# Patient Record
Sex: MALE | Race: WHITE | Employment: FULL TIME | URBAN - METROPOLITAN AREA
[De-identification: names, ages, dates, MRNs, and addresses within clinical notes are randomized per-mention and may not be internally consistent; named-entity substitution may affect disease eponyms.]

---

## 2017-04-19 ENCOUNTER — TRANSFERRED RECORDS (OUTPATIENT)
Dept: HEALTH INFORMATION MANAGEMENT | Facility: CLINIC | Age: 57
End: 2017-04-19

## 2018-05-11 ENCOUNTER — TRANSFERRED RECORDS (OUTPATIENT)
Dept: MULTI SPECIALTY CLINIC | Facility: CLINIC | Age: 58
End: 2018-05-11

## 2018-09-11 ENCOUNTER — TRANSFERRED RECORDS (OUTPATIENT)
Dept: HEALTH INFORMATION MANAGEMENT | Facility: CLINIC | Age: 58
End: 2018-09-11

## 2018-09-11 LAB — EJECTION FRACTION: 58

## 2018-09-21 ENCOUNTER — TRANSFERRED RECORDS (OUTPATIENT)
Dept: HEALTH INFORMATION MANAGEMENT | Facility: CLINIC | Age: 58
End: 2018-09-21

## 2018-09-21 LAB
CHOLEST SERPL-MCNC: 162 MG/DL
CREAT SERPL-MCNC: 0.97 MG/DL (ref 0.8–1.3)
GFR SERPL CREATININE-BSD FRML MDRD: >60 ML/MIN
GLUCOSE SERPL-MCNC: 98 MG/DL (ref 70–110)
HDLC SERPL-MCNC: 53 MG/DL
LDLC SERPL CALC-MCNC: 78 MG/DL
POTASSIUM SERPL-SCNC: 4 MMOL/L (ref 3.5–5.1)
TRIGL SERPL-MCNC: 154 MG/DL
TSH SERPL-ACNC: 0.61 MIU/ML (ref 0.36–3.74)

## 2018-12-14 ENCOUNTER — TRANSFERRED RECORDS (OUTPATIENT)
Dept: HEALTH INFORMATION MANAGEMENT | Facility: CLINIC | Age: 58
End: 2018-12-14

## 2019-10-01 ENCOUNTER — HEALTH MAINTENANCE LETTER (OUTPATIENT)
Age: 59
End: 2019-10-01

## 2019-10-21 ENCOUNTER — OFFICE VISIT (OUTPATIENT)
Dept: FAMILY MEDICINE | Facility: CLINIC | Age: 59
End: 2019-10-21
Payer: COMMERCIAL

## 2019-10-21 VITALS
RESPIRATION RATE: 18 BRPM | HEART RATE: 67 BPM | OXYGEN SATURATION: 97 % | HEIGHT: 62 IN | DIASTOLIC BLOOD PRESSURE: 78 MMHG | WEIGHT: 153 LBS | BODY MASS INDEX: 28.16 KG/M2 | SYSTOLIC BLOOD PRESSURE: 137 MMHG | TEMPERATURE: 97.8 F

## 2019-10-21 DIAGNOSIS — E78.5 HYPERLIPIDEMIA LDL GOAL <130: ICD-10-CM

## 2019-10-21 DIAGNOSIS — Z11.59 ENCOUNTER FOR HEPATITIS C SCREENING TEST FOR LOW RISK PATIENT: ICD-10-CM

## 2019-10-21 DIAGNOSIS — Z00.01 ENCOUNTER FOR ROUTINE ADULT MEDICAL EXAM WITH ABNORMAL FINDINGS: Primary | ICD-10-CM

## 2019-10-21 DIAGNOSIS — Z12.11 SPECIAL SCREENING FOR MALIGNANT NEOPLASMS, COLON: ICD-10-CM

## 2019-10-21 DIAGNOSIS — Z12.5 SCREENING PSA (PROSTATE SPECIFIC ANTIGEN): ICD-10-CM

## 2019-10-21 DIAGNOSIS — I10 BENIGN HYPERTENSION: ICD-10-CM

## 2019-10-21 DIAGNOSIS — E06.3 HYPOTHYROIDISM DUE TO HASHIMOTO'S THYROIDITIS: ICD-10-CM

## 2019-10-21 PROCEDURE — 99214 OFFICE O/P EST MOD 30 MIN: CPT | Mod: 25 | Performed by: PHYSICIAN ASSISTANT

## 2019-10-21 PROCEDURE — 99386 PREV VISIT NEW AGE 40-64: CPT | Performed by: PHYSICIAN ASSISTANT

## 2019-10-21 RX ORDER — SIMVASTATIN 20 MG
20 TABLET ORAL AT BEDTIME
Qty: 90 TABLET | Refills: 3 | Status: SHIPPED | OUTPATIENT
Start: 2019-10-21 | End: 2020-10-27

## 2019-10-21 RX ORDER — LEVOTHYROXINE SODIUM 100 UG/1
100 TABLET ORAL DAILY
Qty: 90 TABLET | Refills: 3 | Status: SHIPPED | OUTPATIENT
Start: 2019-10-21 | End: 2020-10-27

## 2019-10-21 RX ORDER — AMLODIPINE BESYLATE 2.5 MG/1
2.5 TABLET ORAL DAILY
Refills: 3 | COMMUNITY
Start: 2019-07-15 | End: 2019-10-21

## 2019-10-21 RX ORDER — AMLODIPINE BESYLATE 2.5 MG/1
2.5 TABLET ORAL DAILY
Qty: 90 TABLET | Refills: 1 | Status: SHIPPED | OUTPATIENT
Start: 2019-10-21 | End: 2019-10-21

## 2019-10-21 RX ORDER — SIMVASTATIN 20 MG
20 TABLET ORAL
COMMUNITY
Start: 2018-09-24 | End: 2019-10-21

## 2019-10-21 RX ORDER — LEVOTHYROXINE SODIUM 100 UG/1
100 TABLET ORAL DAILY
Refills: 3 | COMMUNITY
Start: 2019-07-15 | End: 2019-10-21

## 2019-10-21 RX ORDER — LISINOPRIL AND HYDROCHLOROTHIAZIDE 20; 25 MG/1; MG/1
1 TABLET ORAL
COMMUNITY
Start: 2018-09-24 | End: 2019-10-21

## 2019-10-21 RX ORDER — AMLODIPINE BESYLATE 5 MG/1
5 TABLET ORAL DAILY
Qty: 90 TABLET | Refills: 1 | Status: SHIPPED | OUTPATIENT
Start: 2019-10-21 | End: 2020-04-28

## 2019-10-21 RX ORDER — LISINOPRIL AND HYDROCHLOROTHIAZIDE 20; 25 MG/1; MG/1
1 TABLET ORAL DAILY
Qty: 90 TABLET | Refills: 1 | Status: SHIPPED | OUTPATIENT
Start: 2019-10-21 | End: 2020-04-28

## 2019-10-21 ASSESSMENT — MIFFLIN-ST. JEOR: SCORE: 1388.25

## 2019-10-21 NOTE — PROGRESS NOTES
SUBJECTIVE:   CC: Esau Avendano is an 59 year old male who presents for preventive health visit.     Healthy Habits:    Do you get at least three servings of calcium containing foods daily (dairy, green leafy vegetables, etc.)? no    Amount of exercise or daily activities, outside of work: none currently     Problems taking medications regularly No    Medication side effects: No    Have you had an eye exam in the past two years? yes    Do you see a dentist twice per year? yes    Do you have sleep apnea, excessive snoring or daytime drowsiness?no      Hyperlipidemia Follow-Up      Are you having any of the following symptoms? (Select all that apply)  No complaints of shortness of breath, chest pain or pressure.  No increased sweating or nausea with activity.  No left-sided neck or arm pain.  No complaints of pain in calves when walking 1-2 blocks.    Are you regularly taking any medication or supplement to lower your cholesterol?   Yes- simvastatin 20mg nightly    Are you having muscle aches or other side effects that you think could be caused by your cholesterol lowering medication?  No      Hypertension Follow-up      Do you check your blood pressure regularly outside of the clinic? No     Are you following a low salt diet? No    Are your blood pressures ever more than 140 on the top number (systolic) OR more   than 90 on the bottom number (diastolic), for example 140/90? Yes  Hypothyroidism Follow-up      Since last visit, patient describes the following symptoms: Weight stable, no hair loss, no skin changes, no constipation, no loose stools      Today's PHQ-2 Score:   PHQ-2 ( 1999 Pfizer) 4/5/2013 2/24/2012   Q1: Little interest or pleasure in doing things 0 0   Q2: Feeling down, depressed or hopeless 0 0   PHQ-2 Score 0 0       Abuse: Current or Past(Physical, Sexual or Emotional)- No  Do you feel safe in your environment? Yes    Social History     Tobacco Use     Smoking status: Never Smoker     Smokeless  tobacco: Never Used   Substance Use Topics     Alcohol use: No     If you drink alcohol do you typically have >3 drinks per day or >7 drinks per week? No                      Last PSA: No results found for: PSA    Reviewed orders with patient. Reviewed health maintenance and updated orders accordingly - Yes  Lab work is in process  BP Readings from Last 3 Encounters:   10/21/19 137/78   04/05/13 120/58   02/24/12 174/84    Wt Readings from Last 3 Encounters:   10/21/19 69.4 kg (153 lb)   04/05/13 62.4 kg (137 lb 9.6 oz)   02/24/12 64.9 kg (143 lb)                  Patient Active Problem List   Diagnosis     Hypothyroidism     Hyperlipidemia LDL goal <130     Benign hypertension     Erectile dysfunction     No past surgical history on file.    Social History     Tobacco Use     Smoking status: Never Smoker     Smokeless tobacco: Never Used   Substance Use Topics     Alcohol use: No     Family History   Problem Relation Age of Onset     Cerebrovascular Disease Mother          Current Outpatient Medications   Medication Sig Dispense Refill     amLODIPine (NORVASC) 5 MG tablet Take 1 tablet (5 mg) by mouth daily 90 tablet 1     levothyroxine (SYNTHROID/LEVOTHROID) 100 MCG tablet Take 1 tablet (100 mcg) by mouth daily 90 tablet 3     lisinopril-hydrochlorothiazide (PRINZIDE/ZESTORETIC) 20-25 MG tablet Take 1 tablet by mouth daily 90 tablet 1     simvastatin (ZOCOR) 20 MG tablet Take 1 tablet (20 mg) by mouth At Bedtime 90 tablet 3       Reviewed and updated as needed this visit by clinical staff  Tobacco  Allergies  Meds         Reviewed and updated as needed this visit by Provider            ROS:  CONSTITUTIONAL: NEGATIVE for fever, chills, change in weight  INTEGUMENTARY/SKIN: NEGATIVE for worrisome rashes, moles or lesions  EYES: NEGATIVE for vision changes or irritation  ENT: NEGATIVE for ear, mouth and throat problems  RESP: NEGATIVE for significant cough or SOB  CV: NEGATIVE for chest pain, palpitations or  "peripheral edema  GI: NEGATIVE for nausea, abdominal pain, heartburn, or change in bowel habits   male: negative for dysuria, hematuria, decreased urinary stream, erectile dysfunction, urethral discharge  MUSCULOSKELETAL: NEGATIVE for significant arthralgias or myalgia  NEURO: NEGATIVE for weakness, dizziness or paresthesias  PSYCHIATRIC: NEGATIVE for changes in mood or affect    OBJECTIVE:   /78   Pulse 67   Temp 97.8  F (36.6  C) (Tympanic)   Resp 18   Ht 1.575 m (5' 2\")   Wt 69.4 kg (153 lb)   SpO2 97%   BMI 27.98 kg/m    EXAM:  GENERAL: healthy, alert and no distress  EYES: Eyes grossly normal to inspection, PERRL and conjunctivae and sclerae normal  HENT: ear canals and TM's normal, nose and mouth without ulcers or lesions  NECK: no adenopathy, no asymmetry, masses, or scars and thyroid normal to palpation  RESP: lungs clear to auscultation - no rales, rhonchi or wheezes  CV: regular rate and rhythm, normal S1 S2, no S3 or S4, no murmur, click or rub, no peripheral edema and peripheral pulses strong  ABDOMEN: soft, nontender, no hepatosplenomegaly, no masses and bowel sounds normal  MS: no gross musculoskeletal defects noted, no edema  SKIN: no suspicious lesions or rashes  NEURO: Normal strength and tone, mentation intact and speech normal  PSYCH: mentation appears normal, affect normal/bright        ASSESSMENT/PLAN:       ICD-10-CM    1. Encounter for routine adult medical exam with abnormal findings Z00.01    2. Hyperlipidemia LDL goal <130 E78.5 simvastatin (ZOCOR) 20 MG tablet     Lipid panel reflex to direct LDL Fasting   3. Benign hypertension I10 lisinopril-hydrochlorothiazide (PRINZIDE/ZESTORETIC) 20-25 MG tablet     Basic metabolic panel  (Ca, Cl, CO2, Creat, Gluc, K, Na, BUN)     amLODIPine (NORVASC) 5 MG tablet     DISCONTINUED: amLODIPine (NORVASC) 2.5 MG tablet   4. Screening PSA (prostate specific antigen) Z12.5 PSA, screen   5. Special screening for malignant neoplasms, colon " "Z12.11    6. Encounter for hepatitis C screening test for low risk patient Z11.59 Hepatitis C Screen Reflex to HCV RNA Quant and Genotype   7. Hypothyroidism due to Hashimoto's thyroiditis E03.8 levothyroxine (SYNTHROID/LEVOTHROID) 100 MCG tablet    E06.3 TSH       COUNSELING:  Reviewed preventive health counseling, as reflected in patient instructions       Regular exercise       Healthy diet/nutrition    Estimated body mass index is 27.98 kg/m  as calculated from the following:    Height as of this encounter: 1.575 m (5' 2\").    Weight as of this encounter: 69.4 kg (153 lb).    Weight management plan: Discussed healthy diet and exercise guidelines     reports that he has never smoked. He has never used smokeless tobacco.      Counseling Resources:  ATP IV Guidelines  Pooled Cohorts Equation Calculator  FRAX Risk Assessment  ICSI Preventive Guidelines  Dietary Guidelines for Americans, 2010  USDA's MyPlate  ASA Prophylaxis  Lung CA Screening    Toni Carrillo PA-C  [facility]  "

## 2020-04-24 ENCOUNTER — TELEPHONE (OUTPATIENT)
Dept: FAMILY MEDICINE | Facility: CLINIC | Age: 60
End: 2020-04-24

## 2020-04-24 DIAGNOSIS — I10 BENIGN HYPERTENSION: ICD-10-CM

## 2020-04-28 ENCOUNTER — VIRTUAL VISIT (OUTPATIENT)
Dept: FAMILY MEDICINE | Facility: CLINIC | Age: 60
End: 2020-04-28
Payer: COMMERCIAL

## 2020-04-28 DIAGNOSIS — I10 BENIGN HYPERTENSION: Primary | ICD-10-CM

## 2020-04-28 DIAGNOSIS — E06.3 HYPOTHYROIDISM DUE TO HASHIMOTO'S THYROIDITIS: ICD-10-CM

## 2020-04-28 DIAGNOSIS — E78.5 HYPERLIPIDEMIA LDL GOAL <130: ICD-10-CM

## 2020-04-28 PROCEDURE — 99214 OFFICE O/P EST MOD 30 MIN: CPT | Mod: TEL | Performed by: PHYSICIAN ASSISTANT

## 2020-04-28 RX ORDER — LISINOPRIL AND HYDROCHLOROTHIAZIDE 20; 25 MG/1; MG/1
1 TABLET ORAL DAILY
Qty: 90 TABLET | Refills: 0 | Status: SHIPPED | OUTPATIENT
Start: 2020-04-28 | End: 2020-08-21

## 2020-04-28 RX ORDER — AMLODIPINE BESYLATE 5 MG/1
TABLET ORAL
Qty: 30 TABLET | Refills: 0 | Status: SHIPPED | OUTPATIENT
Start: 2020-04-28 | End: 2020-04-28

## 2020-04-28 RX ORDER — LISINOPRIL AND HYDROCHLOROTHIAZIDE 20; 25 MG/1; MG/1
TABLET ORAL
Qty: 30 TABLET | Refills: 0 | Status: SHIPPED | OUTPATIENT
Start: 2020-04-28 | End: 2020-04-28

## 2020-04-28 RX ORDER — AMLODIPINE BESYLATE 10 MG/1
10 TABLET ORAL DAILY
Qty: 90 TABLET | Refills: 0 | Status: SHIPPED | OUTPATIENT
Start: 2020-04-28 | End: 2020-08-21

## 2020-04-28 NOTE — TELEPHONE ENCOUNTER
Routing refill request to provider for review/approval because:  Labs not current:  Creatinine, Potassium, Sodium    Last OV with Toni: 10/21/2019    Meche Urbina, RN, BSN

## 2020-04-28 NOTE — PROGRESS NOTES
"Esau Avendano is a 60 year old male who is being evaluated via a billable telephone visit.      The patient has been notified of following:     \"This telephone visit will be conducted via a call between you and your physician/provider. We have found that certain health care needs can be provided without the need for a physical exam.  This service lets us provide the care you need with a short phone conversation.  If a prescription is necessary we can send it directly to your pharmacy.  If lab work is needed we can place an order for that and you can then stop by our lab to have the test done at a later time.    Telephone visits are billed at different rates depending on your insurance coverage. During this emergency period, for some insurers they may be billed the same as an in-person visit.  Please reach out to your insurance provider with any questions.    If during the course of the call the physician/provider feels a telephone visit is not appropriate, you will not be charged for this service.\"    Patient has given verbal consent for Telephone visit?  Yes    How would you like to obtain your AVS? Mail a copy    Subjective     Esau Avendano is a 60 year old male who presents to clinic today for the following health issues:    HPI  Hyperlipidemia Follow-Up      Are you regularly taking any medication or supplement to lower your cholesterol?   Yes- .    Are you having muscle aches or other side effects that you think could be caused by your cholesterol lowering medication?  No    Hypertension Follow-up  Home numbers running in the high normal range.   Denies chest pain/sob/palps/dizziness/ha's.  Walking 3 times a week.    Do you check your blood pressure regularly outside of the clinic? No     Are you following a low salt diet? Yes    Are your blood pressures ever more than 140 on the top number (systolic) OR more   than 90 on the bottom number (diastolic), for example 140/90? No  Working on a Lower fat, " higher fiber diet and consistent exercise.    Hypothyroidism Follow-up      Since last visit, patient describes the following symptoms: Weight stable, no hair loss, no skin changes, no constipation, no loose stools      How many servings of fruits and vegetables do you eat daily?  2-3    On average, how many sweetened beverages do you drink each day (Examples: soda, juice, sweet tea, etc.  Do NOT count diet or artificially sweetened beverages)?   4    How many days per week do you exercise enough to make your heart beat faster? 3 or less    How many minutes a day do you exercise enough to make your heart beat faster? 9 or less    How many days per week do you miss taking your medication? 0    Denies weight changes. No constipation. No fatigue.      Patient Active Problem List   Diagnosis     Hypothyroidism     Hyperlipidemia LDL goal <130     Benign hypertension     Erectile dysfunction     No past surgical history on file.    Social History     Tobacco Use     Smoking status: Never Smoker     Smokeless tobacco: Never Used   Substance Use Topics     Alcohol use: No     Family History   Problem Relation Age of Onset     Cerebrovascular Disease Mother          Current Outpatient Medications   Medication Sig Dispense Refill     amLODIPine (NORVASC) 5 MG tablet Take 1 tablet by mouth once daily 30 tablet 0     levothyroxine (SYNTHROID/LEVOTHROID) 100 MCG tablet Take 1 tablet (100 mcg) by mouth daily 90 tablet 3     lisinopril-hydrochlorothiazide (ZESTORETIC) 20-25 MG tablet Take 1 tablet by mouth once daily 30 tablet 0     simvastatin (ZOCOR) 20 MG tablet Take 1 tablet (20 mg) by mouth At Bedtime 90 tablet 3     Allergies   Allergen Reactions     Augmentin Nausea and Vomiting     Recent Labs   Lab Test 09/21/18 04/05/13  1258 05/04/12  0815   LDL 78 87 101   HDL 53 52 62   TRIG 154* 101 109   ALT  --   --  38   CR 0.97 0.84 0.89   GFRESTIMATED >60 >90 90   GFRESTBLACK >60 >90 >90   POTASSIUM 4.0 4.0 3.7   TSH 0.61 0.57   --       BP Readings from Last 3 Encounters:   10/21/19 137/78   04/05/13 120/58   02/24/12 174/84    Wt Readings from Last 3 Encounters:   10/21/19 69.4 kg (153 lb)   04/05/13 62.4 kg (137 lb 9.6 oz)   02/24/12 64.9 kg (143 lb)                    Reviewed and updated as needed this visit by Provider         Review of Systems   ROS COMP: Constitutional, HEENT, cardiovascular, pulmonary, GI, , musculoskeletal, neuro, skin, endocrine and psych systems are negative, except as otherwise noted.       Objective   Reported vitals:  There were no vitals taken for this visit.   healthy, alert and no distress  PSYCH: Alert and oriented times 3; coherent speech, normal   rate and volume, able to articulate logical thoughts, able   to abstract reason, no tangential thoughts, no hallucinations   or delusions  His affect is normal  RESP: No cough, no audible wheezing, able to talk in full sentences  Remainder of exam unable to be completed due to telephone visits    Diagnostic Test Results:  Labs reviewed in [facility]      Esau was seen today for recheck medication.    Diagnoses and all orders for this visit:    Benign hypertension  -     lisinopril-hydrochlorothiazide (ZESTORETIC) 20-25 MG tablet; Take 1 tablet by mouth daily  -     amLODIPine (NORVASC) 10 MG tablet; Take 1 tablet (10 mg) by mouth daily  Increase amlodipine from 5 to 10 mg daily    Hyperlipidemia LDL goal <130  Continue simvastatin. Reviewed cholesterol numbers from a year ago. Well controlled.  Hypothyroidism due to Hashimoto's thyroiditis  tsh stable. Continue current dose of levothyroxine.          Low salt diet  work on lifestyle modification  Recheck in 3 mos       Phone call duration:  15 minutes    Toni Carrillo PA-C

## 2020-08-18 DIAGNOSIS — I10 BENIGN HYPERTENSION: ICD-10-CM

## 2020-08-21 RX ORDER — AMLODIPINE BESYLATE 10 MG/1
TABLET ORAL
Qty: 30 TABLET | Refills: 0 | Status: SHIPPED | OUTPATIENT
Start: 2020-08-21 | End: 2020-10-13

## 2020-08-21 RX ORDER — LISINOPRIL AND HYDROCHLOROTHIAZIDE 20; 25 MG/1; MG/1
1 TABLET ORAL DAILY
Qty: 30 TABLET | Refills: 0 | Status: SHIPPED | OUTPATIENT
Start: 2020-08-21 | End: 2020-10-13

## 2020-08-21 NOTE — TELEPHONE ENCOUNTER
"Routing refill request to provider for review/approval because:  Labs not current:  Cr, K+, NA from 2018  Patient needs to be seen because:  Pt due for follow up 7/28/20     Medication pended for approval, 30 day supply with reminder.         Requested Prescriptions   Pending Prescriptions Disp Refills     lisinopril-hydrochlorothiazide (ZESTORETIC) 20-25 MG tablet [Pharmacy Med Name: Lisinopril-hydroCHLOROthiazide 20-25 MG Oral Tablet] 90 tablet 0     Sig: Take 1 tablet by mouth once daily       Diuretics (Including Combos) Protocol Failed - 8/19/2020  7:05 AM        Failed - Normal serum creatinine on file in past 12 months     Recent Labs   Lab Test 09/21/18   CR 0.97              Failed - Normal serum potassium on file in past 12 months     Recent Labs   Lab Test 09/21/18   POTASSIUM 4.0                    Failed - Normal serum sodium on file in past 12 months     Recent Labs   Lab Test 04/05/13  1258                 Passed - Blood pressure under 140/90 in past 12 months     BP Readings from Last 3 Encounters:   10/21/19 137/78   04/05/13 120/58   02/24/12 174/84                 Passed - Recent (12 mo) or future (30 days) visit within the authorizing provider's specialty     Patient has had an office visit with the authorizing provider or a provider within the authorizing providers department within the previous 12 mos or has a future within next 30 days. See \"Patient Info\" tab in inbasket, or \"Choose Columns\" in Meds & Orders section of the refill encounter.              Passed - Medication is active on med list        Passed - Patient is age 18 or older       ACE Inhibitors (Including Combos) Protocol Failed - 8/19/2020  7:05 AM        Failed - Normal serum creatinine on file in past 12 months     Recent Labs   Lab Test 09/21/18   CR 0.97       Ok to refill medication if creatinine is low          Failed - Normal serum potassium on file in past 12 months     Recent Labs   Lab Test 09/21/18   POTASSIUM " "4.0             Passed - Blood pressure under 140/90 in past 12 months     BP Readings from Last 3 Encounters:   10/21/19 137/78   04/05/13 120/58   02/24/12 174/84                 Passed - Recent (12 mo) or future (30 days) visit within the authorizing provider's specialty     Patient has had an office visit with the authorizing provider or a provider within the authorizing providers department within the previous 12 mos or has a future within next 30 days. See \"Patient Info\" tab in inbasket, or \"Choose Columns\" in Meds & Orders section of the refill encounter.              Passed - Medication is active on med list        Passed - Patient is age 18 or older           amLODIPine (NORVASC) 10 MG tablet [Pharmacy Med Name: amLODIPine Besylate 10 MG Oral Tablet] 90 tablet 0     Sig: Take 1 tablet by mouth once daily       Calcium Channel Blockers Protocol  Failed - 8/19/2020  7:05 AM        Failed - Normal serum creatinine on file in past 12 months     Recent Labs   Lab Test 09/21/18   CR 0.97       Ok to refill medication if creatinine is low          Passed - Blood pressure under 140/90 in past 12 months     BP Readings from Last 3 Encounters:   10/21/19 137/78   04/05/13 120/58   02/24/12 174/84                 Passed - Recent (12 mo) or future (30 days) visit within the authorizing provider's specialty     Patient has had an office visit with the authorizing provider or a provider within the authorizing providers department within the previous 12 mos or has a future within next 30 days. See \"Patient Info\" tab in inbasket, or \"Choose Columns\" in Meds & Orders section of the refill encounter.              Passed - Medication is active on med list        Passed - Patient is age 18 or older             "

## 2020-09-09 ENCOUNTER — MYC MEDICAL ADVICE (OUTPATIENT)
Dept: FAMILY MEDICINE | Facility: CLINIC | Age: 60
End: 2020-09-09

## 2020-10-27 ENCOUNTER — OFFICE VISIT (OUTPATIENT)
Dept: FAMILY MEDICINE | Facility: CLINIC | Age: 60
End: 2020-10-27
Payer: COMMERCIAL

## 2020-10-27 VITALS
SYSTOLIC BLOOD PRESSURE: 129 MMHG | OXYGEN SATURATION: 99 % | HEART RATE: 64 BPM | BODY MASS INDEX: 27.58 KG/M2 | DIASTOLIC BLOOD PRESSURE: 71 MMHG | RESPIRATION RATE: 20 BRPM | TEMPERATURE: 97.5 F | WEIGHT: 150.8 LBS

## 2020-10-27 DIAGNOSIS — Z53.9 ERRONEOUS ENCOUNTER--DISREGARD: Primary | ICD-10-CM

## 2020-10-27 LAB
ANION GAP SERPL CALCULATED.3IONS-SCNC: 6 MMOL/L (ref 3–14)
BUN SERPL-MCNC: 14 MG/DL (ref 7–30)
CALCIUM SERPL-MCNC: 9.1 MG/DL (ref 8.5–10.1)
CHLORIDE SERPL-SCNC: 102 MMOL/L (ref 94–109)
CHOLEST SERPL-MCNC: 175 MG/DL
CO2 SERPL-SCNC: 29 MMOL/L (ref 20–32)
CREAT SERPL-MCNC: 0.84 MG/DL (ref 0.66–1.25)
GFR SERPL CREATININE-BSD FRML MDRD: >90 ML/MIN/{1.73_M2}
GLUCOSE SERPL-MCNC: 98 MG/DL (ref 70–99)
HDLC SERPL-MCNC: 59 MG/DL
LDLC SERPL CALC-MCNC: 78 MG/DL
NONHDLC SERPL-MCNC: 115 MG/DL
POTASSIUM SERPL-SCNC: 4.1 MMOL/L (ref 3.4–5.3)
PSA SERPL-ACNC: 6.15 UG/L (ref 0–4)
SODIUM SERPL-SCNC: 135 MMOL/L (ref 133–144)
TRIGL SERPL-MCNC: 187 MG/DL
TSH SERPL DL<=0.005 MIU/L-ACNC: 0.96 MU/L (ref 0.4–4)

## 2020-10-27 PROCEDURE — 84443 ASSAY THYROID STIM HORMONE: CPT | Performed by: PHYSICIAN ASSISTANT

## 2020-10-27 PROCEDURE — 36415 COLL VENOUS BLD VENIPUNCTURE: CPT | Performed by: PHYSICIAN ASSISTANT

## 2020-10-27 PROCEDURE — 80048 BASIC METABOLIC PNL TOTAL CA: CPT | Performed by: PHYSICIAN ASSISTANT

## 2020-10-27 PROCEDURE — 80061 LIPID PANEL: CPT | Performed by: PHYSICIAN ASSISTANT

## 2020-10-27 PROCEDURE — G0103 PSA SCREENING: HCPCS | Performed by: PHYSICIAN ASSISTANT

## 2020-10-27 RX ORDER — AMLODIPINE BESYLATE 10 MG/1
TABLET ORAL
Qty: 90 TABLET | Refills: 1 | Status: SHIPPED | OUTPATIENT
Start: 2020-10-27 | End: 2021-05-25

## 2020-10-27 RX ORDER — SIMVASTATIN 20 MG
20 TABLET ORAL AT BEDTIME
Qty: 90 TABLET | Refills: 3 | Status: SHIPPED | OUTPATIENT
Start: 2020-10-27 | End: 2022-01-03

## 2020-10-27 RX ORDER — LEVOTHYROXINE SODIUM 100 UG/1
100 TABLET ORAL DAILY
Qty: 90 TABLET | Refills: 3 | Status: SHIPPED | OUTPATIENT
Start: 2020-10-27 | End: 2022-01-03

## 2020-10-27 RX ORDER — LISINOPRIL AND HYDROCHLOROTHIAZIDE 20; 25 MG/1; MG/1
TABLET ORAL
Qty: 90 TABLET | Refills: 1 | Status: SHIPPED | OUTPATIENT
Start: 2020-10-27 | End: 2021-05-25

## 2020-10-27 NOTE — PROGRESS NOTES
Subjective     Esau Avendano is a 60 year old male who presents to clinic today for the following health issues:  Physical :    HPI         Hyperlipidemia Follow-Up      Are you regularly taking any medication or supplement to lower your cholesterol?   Yes- simvastatin    Are you having muscle aches or other side effects that you think could be caused by your cholesterol lowering medication?  No    Hypertension Follow-up      Do you check your blood pressure regularly outside of the clinic? Yes     Are you following a low salt diet? Yes    Are your blood pressures ever more than 140 on the top number (systolic) OR more   than 90 on the bottom number (diastolic), for example 140/90? No    Hypothyroidism Follow-up      Since last visit, patient describes the following symptoms: dry skin      How many servings of fruits and vegetables do you eat daily?  2-3    On average, how many sweetened beverages do you drink each day (Examples: soda, juice, sweet tea, etc.  Do NOT count diet or artificially sweetened beverages)?   0-1    How many days per week do you exercise enough to make your heart beat faster? 3 or less    How many minutes a day do you exercise enough to make your heart beat faster? 20 - 29    How many days per week do you miss taking your medication? 0    {additonal problems for provider to add (Optional):939384}    Review of Systems   {ROS COMP (Optional):077662}      Objective    There were no vitals taken for this visit.  There is no height or weight on file to calculate BMI.  Physical Exam   {Exam List (Optional):522292}    {Diagnostic Test Results (Optional):824842}        {PROVIDER CHARTING PREFERENCE:611880}

## 2020-10-27 NOTE — PROGRESS NOTES
"  3  SUBJECTIVE:   CC: Esau Avendano is an 60 year old male who presents for preventive health visit.     {Split Bill scripting  The purpose of this visit is to discuss your medical history and prevent health problems before you are sick. You may be responsible for a co-pay, coinsurance, or deductible if your visit today includes services such as checking on a sore throat, having an x-ray or lab test, or treating and evaluating a new or existing condition :195471}  Patient has been advised of split billing requirements and indicates understanding: {Yes and No:674018}  Healthy Habits:    Do you get at least three servings of calcium containing foods daily (dairy, green leafy vegetables, etc.)? { :850457::\"yes\"}    Amount of exercise or daily activities, outside of work: { :330088}    Problems taking medications regularly { :291635::\"No\"}    Medication side effects: { :381194::\"No\"}    Have you had an eye exam in the past two years? { :417546}    Do you see a dentist twice per year? { :287465}    Do you have sleep apnea, excessive snoring or daytime drowsiness?{ :422161}  {Outside tests to abstract? :955931}    {additional problems to add (Optional):455106}    Today's PHQ-2 Score:   PHQ-2 ( 1999 Pfizer) 10/27/2020 4/5/2013   Q1: Little interest or pleasure in doing things 0 0   Q2: Feeling down, depressed or hopeless 0 0   PHQ-2 Score 0 0     {PHQ-2 LOOK IN ASSESSMENTS (Optional) :617240}  Abuse: Current or Past(Physical, Sexual or Emotional)- {YES/NO/NA:928993}  Do you feel safe in your environment? {YES/NO/NA:098225}    Have you ever done Advance Care Planning? (For example, a Health Directive, POLST, or a discussion with a medical provider or your loved ones about your wishes): { :026965}    Social History     Tobacco Use     Smoking status: Never Smoker     Smokeless tobacco: Never Used   Substance Use Topics     Alcohol use: Yes     Comment: weekends     If you drink alcohol do you typically have >3 drinks " "per day or >7 drinks per week? {ETOH :509990}                      Last PSA: No results found for: PSA    Reviewed orders with patient. Reviewed health maintenance and updated orders accordingly - {Yes/No:724534::\"Yes\"}  {Chronicprobdata (Optional):571542}    Reviewed and updated as needed this visit by clinical staff  Tobacco  Allergies  Meds     Fam Hx          Reviewed and updated as needed this visit by Provider                {HISTORY OPTIONS (Optional):995622}    ROS:  { :575519::\"CONSTITUTIONAL: NEGATIVE for fever, chills, change in weight\",\"INTEGUMENTARY/SKIN: NEGATIVE for worrisome rashes, moles or lesions\",\"EYES: NEGATIVE for vision changes or irritation\",\"ENT: NEGATIVE for ear, mouth and throat problems\",\"RESP: NEGATIVE for significant cough or SOB\",\"CV: NEGATIVE for chest pain, palpitations or peripheral edema\",\"GI: NEGATIVE for nausea, abdominal pain, heartburn, or change in bowel habits\",\" male: negative for dysuria, hematuria, decreased urinary stream, erectile dysfunction, urethral discharge\",\"MUSCULOSKELETAL: NEGATIVE for significant arthralgias or myalgia\",\"NEURO: NEGATIVE for weakness, dizziness or paresthesias\",\"PSYCHIATRIC: NEGATIVE for changes in mood or affect\"}    OBJECTIVE:   /71   Pulse 64   Temp 97.5  F (36.4  C) (Tympanic)   Resp 20   Wt 68.4 kg (150 lb 12.8 oz)   SpO2 99%   BMI 27.58 kg/m    EXAM:  {Exam Choices:166662}    {Diagnostic Test Results (Optional):054148::\"Diagnostic Test Results:\",\"Labs reviewed in Epic\"}    ASSESSMENT/PLAN:   {Diag Picklist:552751}    Patient has been advised of split billing requirements and indicates understanding: {YES / NO:489516::\"Yes\"}  COUNSELING:  {MALE COUNSELING MESSAGES:972783::\"Reviewed preventive health counseling, as reflected in patient instructions\"}    Estimated body mass index is 27.58 kg/m  as calculated from the following:    Height as of 10/21/19: 1.575 m (5' 2\").    Weight as of this encounter: 68.4 kg (150 lb 12.8 " oz).    {Weight Management Plan (ACO) Complete if BMI is abnormal-  Ages 18-64  BMI >24.9.  Age 65+ with BMI <23 or >30 (Optional):759895}    He reports that he has never smoked. He has never used smokeless tobacco.      Counseling Resources:  ATP IV Guidelines  Pooled Cohorts Equation Calculator  FRAX Risk Assessment  ICSI Preventive Guidelines  Dietary Guidelines for Americans, 2010  USDA's MyPlate  ASA Prophylaxis  Lung CA Screening    Toni Carrillo PA-C  [facility]

## 2021-01-15 ENCOUNTER — HEALTH MAINTENANCE LETTER (OUTPATIENT)
Age: 61
End: 2021-01-15

## 2021-05-23 DIAGNOSIS — I10 BENIGN HYPERTENSION: Primary | ICD-10-CM

## 2021-05-25 NOTE — TELEPHONE ENCOUNTER
Routing refill request to provider for review/approval because:  Patient needs to be seen because it has been more than 1 year since last office visit.  4/28/20    Medications need diagnosis attached.     Medication pended for approval, 30 day supply with reminder.

## 2021-05-26 RX ORDER — AMLODIPINE BESYLATE 10 MG/1
TABLET ORAL
Qty: 30 TABLET | Refills: 0 | Status: SHIPPED | OUTPATIENT
Start: 2021-05-26 | End: 2021-06-14

## 2021-05-26 RX ORDER — LISINOPRIL AND HYDROCHLOROTHIAZIDE 20; 25 MG/1; MG/1
TABLET ORAL
Qty: 30 TABLET | Refills: 0 | Status: SHIPPED | OUTPATIENT
Start: 2021-05-26 | End: 2021-06-14

## 2021-06-14 ENCOUNTER — OFFICE VISIT (OUTPATIENT)
Dept: FAMILY MEDICINE | Facility: CLINIC | Age: 61
End: 2021-06-14
Payer: COMMERCIAL

## 2021-06-14 VITALS
HEART RATE: 56 BPM | BODY MASS INDEX: 27.44 KG/M2 | OXYGEN SATURATION: 96 % | SYSTOLIC BLOOD PRESSURE: 117 MMHG | TEMPERATURE: 98 F | WEIGHT: 150 LBS | RESPIRATION RATE: 16 BRPM | DIASTOLIC BLOOD PRESSURE: 68 MMHG

## 2021-06-14 DIAGNOSIS — N40.0 PROSTATE ENLARGEMENT: ICD-10-CM

## 2021-06-14 DIAGNOSIS — Z12.5 SCREENING PSA (PROSTATE SPECIFIC ANTIGEN): ICD-10-CM

## 2021-06-14 DIAGNOSIS — E06.3 HYPOTHYROIDISM DUE TO HASHIMOTO'S THYROIDITIS: ICD-10-CM

## 2021-06-14 DIAGNOSIS — Z11.4 SCREENING FOR HIV (HUMAN IMMUNODEFICIENCY VIRUS): ICD-10-CM

## 2021-06-14 DIAGNOSIS — I10 BENIGN HYPERTENSION: Primary | ICD-10-CM

## 2021-06-14 DIAGNOSIS — Z11.59 NEED FOR HEPATITIS C SCREENING TEST: ICD-10-CM

## 2021-06-14 DIAGNOSIS — R97.20 ELEVATED PROSTATE SPECIFIC ANTIGEN (PSA): ICD-10-CM

## 2021-06-14 LAB
ANION GAP SERPL CALCULATED.3IONS-SCNC: 7 MMOL/L (ref 3–14)
BUN SERPL-MCNC: 13 MG/DL (ref 7–30)
CALCIUM SERPL-MCNC: 9 MG/DL (ref 8.5–10.1)
CHLORIDE SERPL-SCNC: 101 MMOL/L (ref 94–109)
CO2 SERPL-SCNC: 27 MMOL/L (ref 20–32)
CREAT SERPL-MCNC: 0.89 MG/DL (ref 0.66–1.25)
GFR SERPL CREATININE-BSD FRML MDRD: >90 ML/MIN/{1.73_M2}
GLUCOSE SERPL-MCNC: 90 MG/DL (ref 70–99)
POTASSIUM SERPL-SCNC: 3.8 MMOL/L (ref 3.4–5.3)
PSA SERPL-MCNC: 6.72 UG/L (ref 0–4)
SODIUM SERPL-SCNC: 135 MMOL/L (ref 133–144)
TSH SERPL DL<=0.005 MIU/L-ACNC: 1.02 MU/L (ref 0.4–4)

## 2021-06-14 PROCEDURE — 84153 ASSAY OF PSA TOTAL: CPT | Performed by: PHYSICIAN ASSISTANT

## 2021-06-14 PROCEDURE — 84443 ASSAY THYROID STIM HORMONE: CPT | Performed by: PHYSICIAN ASSISTANT

## 2021-06-14 PROCEDURE — 86803 HEPATITIS C AB TEST: CPT | Performed by: PHYSICIAN ASSISTANT

## 2021-06-14 PROCEDURE — 87389 HIV-1 AG W/HIV-1&-2 AB AG IA: CPT | Performed by: PHYSICIAN ASSISTANT

## 2021-06-14 PROCEDURE — 36415 COLL VENOUS BLD VENIPUNCTURE: CPT | Performed by: PHYSICIAN ASSISTANT

## 2021-06-14 PROCEDURE — 99214 OFFICE O/P EST MOD 30 MIN: CPT | Performed by: PHYSICIAN ASSISTANT

## 2021-06-14 PROCEDURE — 80048 BASIC METABOLIC PNL TOTAL CA: CPT | Performed by: PHYSICIAN ASSISTANT

## 2021-06-14 RX ORDER — AMLODIPINE BESYLATE 10 MG/1
10 TABLET ORAL DAILY
Qty: 90 TABLET | Refills: 1 | Status: SHIPPED | OUTPATIENT
Start: 2021-06-14 | End: 2022-01-03

## 2021-06-14 RX ORDER — LISINOPRIL AND HYDROCHLOROTHIAZIDE 20; 25 MG/1; MG/1
1 TABLET ORAL DAILY
Qty: 90 TABLET | Refills: 1 | Status: SHIPPED | OUTPATIENT
Start: 2021-06-14 | End: 2022-01-03

## 2021-06-14 NOTE — PROGRESS NOTES
Subjective   Esau is a 61 year old who presents for the following health issues    HPI     Hypertension Follow-up      Do you check your blood pressure regularly outside of the clinic? No     Are you following a low salt diet? Yes    Are your blood pressures ever more than 140 on the top number (systolic) OR more   than 90 on the bottom number (diastolic), for example 140/90? No - does not check outside of clinic      How many servings of fruits and vegetables do you eat daily?  2-3    On average, how many sweetened beverages do you drink each day (Examples: soda, juice, sweet tea, etc.  Do NOT count diet or artificially sweetened beverages)?   2    How many days per week do you exercise enough to make your heart beat faster? 4    How many minutes a day do you exercise enough to make your heart beat faster? 60 or more    How many days per week do you miss taking your medication? 0    Some dec in urine stream. Nocturia x 1-2.   No chest pain/sob/palpitations/dizziness/ha's  No fhx of prostate cancer    Some ED issues.  Recheck of his hypothyroidism. Denies constipation or profound fatigue.     Review of Systems   Constitutional, HEENT, cardiovascular, pulmonary, GI, , musculoskeletal, neuro, skin, endocrine and psych systems are negative, except as otherwise noted.      Objective    /68   Pulse 56   Temp 98  F (36.7  C) (Tympanic)   Resp 16   Wt 68 kg (150 lb)   SpO2 96%   BMI 27.44 kg/m    Body mass index is 27.44 kg/m .  Physical Exam   Eye exam - right eye normal lid, conjunctiva, cornea, pupil and fundus, left eye normal lid, conjunctiva, cornea, pupil and fundus.  Thyroid not palpable, not enlarged, no nodules detected.  CHEST:chest clear to IPPA, no tachypnea, retractions or cyanosis. pvc's noted. no gallop, rate regular.  No edema  Right low of prostate enlarged. No distinct mass, but definitely some asymmetry with regard to the right and left lobe.    Esau was seen today for  hypertension.    Diagnoses and all orders for this visit:    Benign hypertension  -     BASIC METABOLIC PANEL  -     lisinopril-hydrochlorothiazide (ZESTORETIC) 20-25 MG tablet; Take 1 tablet by mouth daily  -     amLODIPine (NORVASC) 10 MG tablet; Take 1 tablet (10 mg) by mouth daily    Screening for HIV (human immunodeficiency virus)  -     HIV Antigen Antibody Combo    Need for hepatitis C screening test  -     Hepatitis C Screen Reflex to HCV RNA Quant and Genotype    Hypothyroidism due to Hashimoto's thyroiditis  -     TSH    Screening PSA (prostate specific antigen)    Elevated prostate specific antigen (PSA)  -     PSA, tumor marker  -     Adult Urology Referral; Future    Prostate enlargement  -     Adult Urology Referral; Future    Other orders  -     REVIEW OF HEALTH MAINTENANCE PROTOCOL ORDERS      work on lifestyle modification  Advised supportive and symptomatic treatment.  Follow up with Provider - if condition persists or worsens.

## 2021-06-15 LAB
HCV AB SERPL QL IA: NONREACTIVE
HIV 1+2 AB+HIV1 P24 AG SERPL QL IA: NONREACTIVE

## 2021-07-14 ENCOUNTER — VIRTUAL VISIT (OUTPATIENT)
Dept: UROLOGY | Facility: CLINIC | Age: 61
End: 2021-07-14
Attending: PHYSICIAN ASSISTANT
Payer: COMMERCIAL

## 2021-07-14 DIAGNOSIS — N40.0 PROSTATE ENLARGEMENT: ICD-10-CM

## 2021-07-14 DIAGNOSIS — R97.20 ELEVATED PROSTATE SPECIFIC ANTIGEN (PSA): ICD-10-CM

## 2021-07-14 PROCEDURE — 99203 OFFICE O/P NEW LOW 30 MIN: CPT | Mod: GT | Performed by: UROLOGY

## 2021-07-14 NOTE — LETTER
7/14/2021       RE: Esau Avendano  [address] [place] [facility] [address]     Dear Colleague,    Thank you for referring your patient, Esau Avendano, to the [facility] at [facility]. Please see a copy of my visit note below.    Esau Avendano is a 61 year old male who is being evaluated via a billable video visit.    Patient has given verbal consent for Video visit? Yes  How would you like to obtain your AVS? MyChart  Will anyone else be joining your video visit? No      Video-Visit Details    Type of service:  Video Visit    Video Start Time: 2:19 PM  Video End Time: 2:33 PM     Originating Location (pt. Location): Home    Distant Location (provider location):  [facility] UROLOGY AND [facility] FOR PROSTATE AND UROLOGIC CANCERS     Platform used for Video Visit: [employer]      HPI:  Esau Avendano is a 61 year old male being seen for elevated PSA, consult from Toni Carrillo for elevated PSA and abnormal NICHOLAS.    No family history of prostate cancer.    No new lower urinary tract symptoms, but does have slower flow and nocturia at least 1x/ night.    Mr. Avendano had an elevated screening PSA last October, it was 6.1 at that time. He had it rechecked this summer at 6.7. He has a brother-in-law with prostate cancer, but no family history of prostate cancer that he knows of. He does not have symptoms of prostatitis. He is here to discuss the elevated PSA level. He states his primary also felt that the prostate was more enlarged on one side, so he has an abnormal NICHOLAS also.    Patient Active Problem List   Diagnosis     Hypothyroidism     Hyperlipidemia LDL goal <130     Benign hypertension     Erectile dysfunction     Current Outpatient Medications   Medication     amLODIPine (NORVASC) 10 MG tablet     levothyroxine (SYNTHROID/LEVOTHROID) 100 MCG tablet     lisinopril-hydrochlorothiazide (ZESTORETIC) 20-25 MG tablet     simvastatin (ZOCOR) 20 MG tablet      No current facility-administered medications for this visit.        Exam:  General- Alert, oriented, nad.  Pleasant and conversant.  Eyes- anicteric, EOMI.  Resps- normal, non-labored.  No cough  Abdomen-  nondistended.   exam- deferred.   Neurological - no tremors  Skin - no discoloration/ lesions noted  Psychiatric - no anxiety, alert & oriented.      The rest of a comprehensive physical examination is deferred due to video visit restrictions.       Review of Imaging:  The following imaging exams were independently viewed and interpreted by me and discussed with patient:  None      Review of Labs:  The following labs were reviewed by me and discussed with the patient:  Recent Results (from the past 720 hour(s))   HIV Antigen Antibody Combo    Collection Time: 06/14/21  4:24 PM   Result Value Ref Range    HIV Antigen Antibody Combo Nonreactive NR^Nonreactive       Hepatitis C Screen Reflex to HCV RNA Quant and Genotype    Collection Time: 06/14/21  4:24 PM   Result Value Ref Range    Hepatitis C Antibody Nonreactive NR^Nonreactive   BASIC METABOLIC PANEL    Collection Time: 06/14/21  4:24 PM   Result Value Ref Range    Sodium 135 133 - 144 mmol/L    Potassium 3.8 3.4 - 5.3 mmol/L    Chloride 101 94 - 109 mmol/L    Carbon Dioxide 27 20 - 32 mmol/L    Anion Gap 7 3 - 14 mmol/L    Glucose 90 70 - 99 mg/dL    Urea Nitrogen 13 7 - 30 mg/dL    Creatinine 0.89 0.66 - 1.25 mg/dL    GFR Estimate >90 >60 mL/min/[1.73_m2]    GFR Estimate If Black >90 >60 mL/min/[1.73_m2]    Calcium 9.0 8.5 - 10.1 mg/dL   PSA, tumor marker    Collection Time: 06/14/21  4:24 PM   Result Value Ref Range    PSA 6.72 (H) 0 - 4 ug/L   TSH    Collection Time: 06/14/21  4:24 PM   Result Value Ref Range    TSH 1.02 0.40 - 4.00 mU/L     Lab Results   Component Value Date    PSA 6.72 06/14/2021    PSA 6.15 10/27/2020          Assessment & Plan      1. Elevated PSA.    2. Options discussed for the elevated PSA are:    1. Observation.   2. Prostate  biopsy in urology clinic.    3. Prostate MRI ( which will help decide on prostate biopsy or not, and can be used to more accurately guide the prostate biopsy, if needed).      He would like to proceed with a prostate MRI as I recommend. I will contact him with options and we will decide whether to do a fusion biopsy or the standard ultrasound-guided biopsy of the prostate.      Lyndon Courtney MD  [facility] [place]      ==========================      Additional Coding Information:    Problems:  3 -- one acute uncomplicated illness or injury    Data Reviewed  Review of the result(s) of each unique test - PSA tests    Level of risk:  3 -- low risk (e.g., OTC medication or observation, minor surgery without risks)    Time spent:  17 minutes spent on the date of the encounter doing chart review, history and exam, documentation and further activities per the note              Again, thank you for allowing me to participate in the care of your patient.      Sincerely,    Lyndon Courtney MD

## 2021-07-14 NOTE — PROGRESS NOTES
Esau Avendano is a 61 year old male who is being evaluated via a billable video visit.    Patient has given verbal consent for Video visit? Yes  How would you like to obtain your AVS? MyChart  Will anyone else be joining your video visit? No      Video-Visit Details    Type of service:  Video Visit    Video Start Time: 2:19 PM  Video End Time: 2:33 PM     Originating Location (pt. Location): Home    Distant Location (provider location):  [facility] UROLOGY AND [facility] FOR PROSTATE AND UROLOGIC CANCERS     Platform used for Video Visit: [email or web address]      HPI:  Esau Avendano is a 61 year old male being seen for elevated PSA, consult from Toni Carrillo for elevated PSA and abnormal NICHOLAS.    No family history of prostate cancer.    No new lower urinary tract symptoms, but does have slower flow and nocturia at least 1x/ night.    Mr. Avendano had an elevated screening PSA last October, it was 6.1 at that time. He had it rechecked this summer at 6.7. He has a brother-in-law with prostate cancer, but no family history of prostate cancer that he knows of. He does not have symptoms of prostatitis. He is here to discuss the elevated PSA level. He states his primary also felt that the prostate was more enlarged on one side, so he has an abnormal NICHOLAS also.    Patient Active Problem List   Diagnosis     Hypothyroidism     Hyperlipidemia LDL goal <130     Benign hypertension     Erectile dysfunction     Current Outpatient Medications   Medication     amLODIPine (NORVASC) 10 MG tablet     levothyroxine (SYNTHROID/LEVOTHROID) 100 MCG tablet     lisinopril-hydrochlorothiazide (ZESTORETIC) 20-25 MG tablet     simvastatin (ZOCOR) 20 MG tablet     No current facility-administered medications for this visit.        Exam:  General- Alert, oriented, nad.  Pleasant and conversant.  Eyes- anicteric, EOMI.  Resps- normal, non-labored.  No cough  Abdomen-  nondistended.   exam- deferred.   Neurological - no tremors  Skin - no discoloration/ lesions  noted  Psychiatric - no anxiety, alert & oriented.      The rest of a comprehensive physical examination is deferred due to video visit restrictions.       Review of Imaging:  The following imaging exams were independently viewed and interpreted by me and discussed with patient:  None      Review of Labs:  The following labs were reviewed by me and discussed with the patient:  Recent Results (from the past 720 hour(s))   HIV Antigen Antibody Combo    Collection Time: 06/14/21  4:24 PM   Result Value Ref Range    HIV Antigen Antibody Combo Nonreactive NR^Nonreactive       Hepatitis C Screen Reflex to HCV RNA Quant and Genotype    Collection Time: 06/14/21  4:24 PM   Result Value Ref Range    Hepatitis C Antibody Nonreactive NR^Nonreactive   BASIC METABOLIC PANEL    Collection Time: 06/14/21  4:24 PM   Result Value Ref Range    Sodium 135 133 - 144 mmol/L    Potassium 3.8 3.4 - 5.3 mmol/L    Chloride 101 94 - 109 mmol/L    Carbon Dioxide 27 20 - 32 mmol/L    Anion Gap 7 3 - 14 mmol/L    Glucose 90 70 - 99 mg/dL    Urea Nitrogen 13 7 - 30 mg/dL    Creatinine 0.89 0.66 - 1.25 mg/dL    GFR Estimate >90 >60 mL/min/[1.73_m2]    GFR Estimate If Black >90 >60 mL/min/[1.73_m2]    Calcium 9.0 8.5 - 10.1 mg/dL   PSA, tumor marker    Collection Time: 06/14/21  4:24 PM   Result Value Ref Range    PSA 6.72 (H) 0 - 4 ug/L   TSH    Collection Time: 06/14/21  4:24 PM   Result Value Ref Range    TSH 1.02 0.40 - 4.00 mU/L     Lab Results   Component Value Date    PSA 6.72 06/14/2021    PSA 6.15 10/27/2020          Assessment & Plan      1. Elevated PSA.    2. Options discussed for the elevated PSA are:    1. Observation.   2. Prostate biopsy in urology clinic.    3. Prostate MRI ( which will help decide on prostate biopsy or not, and can be used to more accurately guide the prostate biopsy, if needed).      He would like to proceed with a prostate MRI as I recommend. I will contact him with options and we will decide whether to do a  fusion biopsy or the standard ultrasound-guided biopsy of the prostate.      Lyndon Courtney MD  [facility] [place]      ==========================      Additional Coding Information:    Problems:  3 -- one acute uncomplicated illness or injury    Data Reviewed  Review of the result(s) of each unique test - PSA tests    Level of risk:  3 -- low risk (e.g., OTC medication or observation, minor surgery without risks)    Time spent:  17 minutes spent on the date of the encounter doing chart review, history and exam, documentation and further activities per the note. This includes the video visit time above.

## 2021-08-13 ENCOUNTER — HOSPITAL ENCOUNTER (OUTPATIENT)
Dept: MRI IMAGING | Facility: CLINIC | Age: 61
Discharge: HOME | End: 2021-08-13
Attending: UROLOGY | Admitting: UROLOGY
Payer: COMMERCIAL

## 2021-08-13 DIAGNOSIS — N40.0 PROSTATE ENLARGEMENT: ICD-10-CM

## 2021-08-13 DIAGNOSIS — R97.20 ELEVATED PROSTATE SPECIFIC ANTIGEN (PSA): ICD-10-CM

## 2021-08-13 PROCEDURE — 255N000002 HC RX 255 OP 636: Performed by: UROLOGY

## 2021-08-13 PROCEDURE — A9585 GADOBUTROL INJECTION: HCPCS | Performed by: UROLOGY

## 2021-08-13 PROCEDURE — 72197 MRI PELVIS W/O & W/DYE: CPT

## 2021-08-13 PROCEDURE — 72197 MRI PELVIS W/O & W/DYE: CPT | Mod: 26 | Performed by: STUDENT IN AN ORGANIZED HEALTH CARE EDUCATION/TRAINING PROGRAM

## 2021-08-13 RX ORDER — GADOBUTROL 604.72 MG/ML
7.5 INJECTION INTRAVENOUS ONCE
Status: COMPLETED | OUTPATIENT
Start: 2021-08-13 | End: 2021-08-13

## 2021-08-13 RX ADMIN — GADOBUTROL 7.5 ML: 604.72 INJECTION INTRAVENOUS at 15:10

## 2021-08-24 NOTE — RESULT ENCOUNTER NOTE
Can you please contact Radha Romana and see if he'd like to schedule a prostate biopsy with me.  His prostate MRI was low suspicion for significant prostate cancer but a baseline biopsy is still very reasonable to consider.  Thank You    Radha WEISS

## 2021-08-26 ENCOUNTER — TELEPHONE (OUTPATIENT)
Dept: UROLOGY | Facility: CLINIC | Age: 61
End: 2021-08-26

## 2021-08-26 DIAGNOSIS — R97.20 ELEVATED PROSTATE SPECIFIC ANTIGEN (PSA): Primary | ICD-10-CM

## 2021-08-26 RX ORDER — CEFUROXIME AXETIL 500 MG/1
500 TABLET ORAL ONCE
Qty: 1 TABLET | Refills: 0 | Status: CANCELLED | OUTPATIENT
Start: 2021-08-26 | End: 2021-08-26

## 2021-08-26 RX ORDER — CIPROFLOXACIN 500 MG/1
500 TABLET, FILM COATED ORAL 2 TIMES DAILY
Qty: 6 TABLET | Refills: 0 | Status: CANCELLED | OUTPATIENT
Start: 2021-08-26

## 2021-08-26 NOTE — TELEPHONE ENCOUNTER
Lyndon Courtney MD  [facility] Urology Adult [place]  Can you please contact Mr. Avendano and see if he'd like to schedule a prostate biopsy with me.  His prostate MRI was low suspicion for significant prostate cancer but a baseline biopsy is still very reasonable to consider.   Thank You     Radha WEISS       Received the 8/13/21 Prostate MRI results and the above result note from Dr. Courtney. Attempted to reach patient by phone, but no answer. Left generic message with request to return call to clinic and that an update was also sent through my chart. When patient returns call, will inform him of the above information and assist in scheduling.    Renae Estrada RN, BSN

## 2021-08-26 NOTE — LETTER
Mr.Thomas LEONEL Avendano  [address] [place] [id number]        September 3, 2021    Dear ,    We have attempted to reach you by phone for follow up after completion of the prostate MRI. At your convenience, please call the [facility] Urology clinic at [phone] to discuss Dr. Courtney's recommendations further with a urology nurse.       Thank you,    Dr. Courtney's Office

## 2021-08-31 NOTE — TELEPHONE ENCOUNTER
Attempted to reach patient by phone, but no answer. Left generic message with request to return call to clinic. When patient returns call, will discuss note below and assist in scheduling prostate biopsy with Dr. Courtney.    Renae Estrada RN, BSN

## 2021-09-03 NOTE — TELEPHONE ENCOUNTER
No call back received from patient. Letter written and sent to patient's home address on file.    Renae Estrada RN, BSN

## 2021-09-04 ENCOUNTER — HEALTH MAINTENANCE LETTER (OUTPATIENT)
Age: 61
End: 2021-09-04

## 2021-09-10 NOTE — TELEPHONE ENCOUNTER
Pt returned phone call.  No one from the care team was available at the time he called.  Please try calling Pt back.  Thanks.

## 2021-09-13 RX ORDER — CEFUROXIME AXETIL 500 MG/1
500 TABLET ORAL ONCE
Qty: 1 TABLET | Refills: 0 | Status: CANCELLED | OUTPATIENT
Start: 2021-09-13 | End: 2021-09-13

## 2021-09-13 RX ORDER — CIPROFLOXACIN 500 MG/1
500 TABLET, FILM COATED ORAL 2 TIMES DAILY
Qty: 6 TABLET | Refills: 0 | Status: CANCELLED | OUTPATIENT
Start: 2021-09-13 | End: 2021-09-16

## 2021-09-13 NOTE — TELEPHONE ENCOUNTER
"Called and spoke to patient who is aware of Dr. Courtney's recommendation for a baseline prostate biopsy. Per patient, with the low suspicion for prostate cancer on MRI he would like to hold off on the prostate biopsy if possible. Patient stated, \"I was thinking that if the MRI was okay then I wouldn't need all of this additional. And cost is part of the reason, too.\" Informed patient that a message will be sent to Dr. Courtney with request to review and give recommendations for follow up. Patient aware that he will be contacted with additional recommendations.    Renae Estrada RN, BSN    "

## 2021-09-13 NOTE — TELEPHONE ENCOUNTER
The patient returned a call to Dr. Courtney's Care Team.  He will wait for a call back.  Thank you.

## 2021-09-13 NOTE — TELEPHONE ENCOUNTER
Attempted to reach patient by phone, but no answer. Left generic message with request to return call to clinic. When patient returns call, will discuss Dr. Courtney's recommendation for a baseline prostate biopsy, assist in scheduling prostate biopsy, send pre-procedure antibiotics to patient's preferred pharmacy and discuss pre-procedure instructions.    Renae Estrada RN, BSN

## 2021-09-16 NOTE — TELEPHONE ENCOUNTER
Sherwin, Lyndon Rivera MD  You Yesterday (12:24 AM)     LUCRECIA    I sent him a my chart message, letting him know there is a 25 to 30% chance of finding prostate cancer on a biopsy, even with nonsuspicious MRI results.      He will think about a biopsy, and let us know if he would like to proceed.      At the very least, I would like you to set him up for a 6-month follow-up with PSA blood test please thanks       LUCRECIA      Received the above message from Dr. Courtney. Per chart review, patient reviewed my chart message from Dr. Courtney on 9/15/21. Attempted to reach patient by phone, but no answer. Left generic message with request to return call to clinic. When patient returns call, will assist in scheduling lab only appointment for PSA and follow up visit with Dr. Courtney in 6 months if this is patient's preference.    Renae Estrada RN, BSN

## 2021-09-21 NOTE — TELEPHONE ENCOUNTER
No call back received from patient. My chart message sent. See 9/21/21 my chart encounter.    Renae Estrada RN, BSN

## 2022-01-02 DIAGNOSIS — E06.3 HYPOTHYROIDISM DUE TO HASHIMOTO'S THYROIDITIS: ICD-10-CM

## 2022-01-02 DIAGNOSIS — I10 BENIGN HYPERTENSION: ICD-10-CM

## 2022-01-02 DIAGNOSIS — E78.5 HYPERLIPIDEMIA LDL GOAL <130: Primary | ICD-10-CM

## 2022-01-03 RX ORDER — AMLODIPINE BESYLATE 10 MG/1
10 TABLET ORAL DAILY
Qty: 90 TABLET | Refills: 0 | Status: SHIPPED | OUTPATIENT
Start: 2022-01-03 | End: 2022-04-03

## 2022-01-03 RX ORDER — LEVOTHYROXINE SODIUM 100 UG/1
TABLET ORAL
Qty: 90 TABLET | Refills: 0 | Status: SHIPPED | OUTPATIENT
Start: 2022-01-03 | End: 2022-04-03

## 2022-01-03 RX ORDER — SIMVASTATIN 20 MG
20 TABLET ORAL AT BEDTIME
Qty: 90 TABLET | Refills: 0 | Status: SHIPPED | OUTPATIENT
Start: 2022-01-03 | End: 2022-04-03

## 2022-01-03 RX ORDER — LISINOPRIL AND HYDROCHLOROTHIAZIDE 20; 25 MG/1; MG/1
TABLET ORAL
Qty: 90 TABLET | Refills: 0 | Status: SHIPPED | OUTPATIENT
Start: 2022-01-03 | End: 2022-04-03

## 2022-01-03 NOTE — TELEPHONE ENCOUNTER
Medication is being filled for 1 time refill only due to:  Patient needs to be seen because need recheck and fasting labs.

## 2022-01-17 NOTE — PROGRESS NOTES
"      Delmy Prado is a 61 year old who presents for the following health issues    History of Present Illness       Hyperlipidemia:  He presents for follow up of hyperlipidemia.  He is taking medication to lower cholesterol. He is not having myalgia or other side effects to statin medications.    Hypertension: He presents for follow up of hypertension.  He does not check blood pressure  regularly outside of the clinic. Outside blood pressures have been over 140/90. He follows a low salt diet.     Hypothyroidism:     Since last visit, patient describes the following symptoms::  None    He eats 2-3 servings of fruits and vegetables daily.He consumes 0 sweetened beverage(s) daily.He exercises with enough effort to increase his heart rate 9 or less minutes per day.  He exercises with enough effort to increase his heart rate 3 or less days per week.   He is taking medications regularly.     No chest pain/sob/palpitations/dizziness/ha's  Active with bicycling and disk golf 6-8 months out of the year.   Watching his diet some.   Low back pain evolved when shoveling a couple of weeks ago.   Starting to see a chiropractor   Review of Systems   Constitutional, HEENT, cardiovascular, pulmonary, GI, , musculoskeletal, neuro, skin, endocrine and psych systems are negative, except as otherwise noted.      Objective    /66   Pulse 54   Temp 97.3  F (36.3  C) (Tympanic)   Resp 16   Ht 1.575 m (5' 2\")   Wt 67.6 kg (149 lb)   SpO2 97%   BMI 27.25 kg/m    Body mass index is 27.25 kg/m .  Physical Exam     Eye exam - right eye normal lid, conjunctiva, cornea, pupil and fundus, left eye normal lid, conjunctiva, cornea, pupil and fundus.  Thyroid not palpable, not enlarged, no nodules detected.  CHEST:chest clear to IPPA, no tachypnea, retractions or cyanosis and S1, S2 normal, no murmur, no gallop, rate regular.  Foot exam - both sides normal; no swelling, tenderness or skin or vascular lesions. Color and temperature " is normal. Sensation is intact. Peripheral pulses are palpable. Toenails are normal.  Lumbosacral spine area reveals no local tenderness or mass.  Full and painless lumbosacral range of motion is noted. Straight leg raise is negative at 90 degrees on both sides. DTR's, motor strength and sensation normal, including heel and toe gait.  Peripheral pulses are palpable. Hips and knees have full range of motion without pain. No abdominal tenderness, mass or organomegaly.    Esau was seen today for hypertension, hyperlipidemia and thyroid problem.    Diagnoses and all orders for this visit:    Benign hypertension  -     BASIC METABOLIC PANEL; Future    Hyperlipidemia LDL goal <130  -     Lipid panel reflex to direct LDL Fasting; Future    Hypothyroidism due to Hashimoto's thyroiditis  -     TSH; Future    Colon cancer screening  -     Fecal colorectal cancer screen (FIT); Future    Screening PSA (prostate specific antigen)  -     PSA, tumor marker; Future    Strain of lumbar region, initial encounter      Advised supportive and symptomatic treatment for his back pain. Continue seeing chiro.   Follow up with Provider - if condition persists or worsens.   Continue current meds and doses.  work on lifestyle modification  Recheck in 6 mos

## 2022-01-18 ENCOUNTER — OFFICE VISIT (OUTPATIENT)
Dept: FAMILY MEDICINE | Facility: CLINIC | Age: 62
End: 2022-01-18
Payer: COMMERCIAL

## 2022-01-18 VITALS
TEMPERATURE: 97.3 F | OXYGEN SATURATION: 97 % | WEIGHT: 149 LBS | RESPIRATION RATE: 16 BRPM | HEART RATE: 54 BPM | DIASTOLIC BLOOD PRESSURE: 66 MMHG | HEIGHT: 62 IN | SYSTOLIC BLOOD PRESSURE: 135 MMHG | BODY MASS INDEX: 27.42 KG/M2

## 2022-01-18 DIAGNOSIS — Z12.5 SCREENING PSA (PROSTATE SPECIFIC ANTIGEN): ICD-10-CM

## 2022-01-18 DIAGNOSIS — E06.3 HYPOTHYROIDISM DUE TO HASHIMOTO'S THYROIDITIS: ICD-10-CM

## 2022-01-18 DIAGNOSIS — I10 BENIGN HYPERTENSION: Primary | ICD-10-CM

## 2022-01-18 DIAGNOSIS — E78.5 HYPERLIPIDEMIA LDL GOAL <130: ICD-10-CM

## 2022-01-18 DIAGNOSIS — Z12.11 COLON CANCER SCREENING: ICD-10-CM

## 2022-01-18 DIAGNOSIS — S39.012A STRAIN OF LUMBAR REGION, INITIAL ENCOUNTER: ICD-10-CM

## 2022-01-18 LAB
ANION GAP SERPL CALCULATED.3IONS-SCNC: 7 MMOL/L (ref 3–14)
BUN SERPL-MCNC: 14 MG/DL (ref 7–30)
CALCIUM SERPL-MCNC: 9.4 MG/DL (ref 8.5–10.1)
CHLORIDE BLD-SCNC: 104 MMOL/L (ref 94–109)
CHOLEST SERPL-MCNC: 176 MG/DL
CO2 SERPL-SCNC: 27 MMOL/L (ref 20–32)
CREAT SERPL-MCNC: 0.87 MG/DL (ref 0.66–1.25)
FASTING STATUS PATIENT QL REPORTED: YES
GFR SERPL CREATININE-BSD FRML MDRD: >90 ML/MIN/1.73M2
GLUCOSE BLD-MCNC: 107 MG/DL (ref 70–99)
HDLC SERPL-MCNC: 63 MG/DL
LDLC SERPL CALC-MCNC: 79 MG/DL
NONHDLC SERPL-MCNC: 113 MG/DL
POTASSIUM BLD-SCNC: 3.6 MMOL/L (ref 3.4–5.3)
PSA SERPL-MCNC: 9.96 UG/L (ref 0–4)
SODIUM SERPL-SCNC: 138 MMOL/L (ref 133–144)
TRIGL SERPL-MCNC: 170 MG/DL
TSH SERPL DL<=0.005 MIU/L-ACNC: 0.98 MU/L (ref 0.4–4)

## 2022-01-18 PROCEDURE — 84153 ASSAY OF PSA TOTAL: CPT | Performed by: PHYSICIAN ASSISTANT

## 2022-01-18 PROCEDURE — 99214 OFFICE O/P EST MOD 30 MIN: CPT | Performed by: PHYSICIAN ASSISTANT

## 2022-01-18 PROCEDURE — 36415 COLL VENOUS BLD VENIPUNCTURE: CPT | Performed by: PHYSICIAN ASSISTANT

## 2022-01-18 PROCEDURE — 84443 ASSAY THYROID STIM HORMONE: CPT | Performed by: PHYSICIAN ASSISTANT

## 2022-01-18 PROCEDURE — 80048 BASIC METABOLIC PNL TOTAL CA: CPT | Performed by: PHYSICIAN ASSISTANT

## 2022-01-18 PROCEDURE — 80061 LIPID PANEL: CPT | Performed by: PHYSICIAN ASSISTANT

## 2022-01-18 ASSESSMENT — PAIN SCALES - GENERAL: PAINLEVEL: MILD PAIN (2)

## 2022-01-18 ASSESSMENT — MIFFLIN-ST. JEOR: SCORE: 1360.11

## 2022-02-19 ENCOUNTER — HEALTH MAINTENANCE LETTER (OUTPATIENT)
Age: 62
End: 2022-02-19

## 2022-03-06 ENCOUNTER — MYC MEDICAL ADVICE (OUTPATIENT)
Dept: UROLOGY | Facility: CLINIC | Age: 62
End: 2022-03-06
Payer: COMMERCIAL

## 2022-03-06 DIAGNOSIS — R97.20 ELEVATED PROSTATE SPECIFIC ANTIGEN (PSA): Primary | ICD-10-CM

## 2022-03-08 NOTE — CONFIDENTIAL NOTE
Lyndon Courtney MD  You 1 hour ago (8:01 AM)     LUCRECIA    A prostate biopsy would be the next step for him- I can do this at , or Dr. De Leon could also do it.  Whatever is easier for him.  Can you help set this up?     Thank You   LUCRECIA      Received the above message from Dr. Courtney. My chart message sent to patient.    Renae Estrada RN, BSN

## 2022-03-10 ENCOUNTER — TELEPHONE (OUTPATIENT)
Dept: UROLOGY | Facility: CLINIC | Age: 62
End: 2022-03-10
Payer: COMMERCIAL

## 2022-03-10 NOTE — TELEPHONE ENCOUNTER
Patient responded and contacted via my chart. See 3/6/22 my chart encounter.    Renae Estrada RN, BSN

## 2022-03-10 NOTE — TELEPHONE ENCOUNTER
[facility] Call Center    Phone Message    May a detailed message be left on voicemail: yes     Reason for Call: Johan wanted to know if he can get his biopsy done sooner, please call him back    Action Taken: Message routed to:  Adult Clinics: Urology p [id number]    Travel Screening: Not Applicable

## 2022-03-10 NOTE — TELEPHONE ENCOUNTER
Attempted to reach pt.  Left detailed message to call clinic back at [phone].   To inform pt that 4/13/22 is the soonest appointment available.

## 2022-03-11 RX ORDER — CIPROFLOXACIN 500 MG/1
500 TABLET, FILM COATED ORAL 2 TIMES DAILY
Qty: 6 TABLET | Refills: 0 | Status: SHIPPED | OUTPATIENT
Start: 2022-04-12 | End: 2022-07-19

## 2022-03-11 RX ORDER — CEFUROXIME AXETIL 500 MG/1
500 TABLET ORAL ONCE
Qty: 1 TABLET | Refills: 0 | Status: SHIPPED | OUTPATIENT
Start: 2022-04-13 | End: 2022-04-13

## 2022-03-13 NOTE — TELEPHONE ENCOUNTER
frankie is due for a visit with me. Schedule him for a phone visit with me for a recheck of his blood pressure this week.     13-Mar-2022 10:10

## 2022-03-14 NOTE — CONFIDENTIAL NOTE
Lyndon Courtney MD  You 2 days ago     LUCRECIA    Yes, a PSA rise over 2 points in a year is concerning.  I definitely would recommend following through with the prostate biopsy.     4/13/22 is reasonable time period for the biopsy, I don't have a problem with that.       Thank You   LUCRECIA      Received the above message from Dr. Courtney. My chart message sent to patient.    Renae Estrada RN, BSN

## 2022-04-03 ENCOUNTER — MYC REFILL (OUTPATIENT)
Dept: FAMILY MEDICINE | Facility: CLINIC | Age: 62
End: 2022-04-03
Payer: COMMERCIAL

## 2022-04-03 DIAGNOSIS — E78.5 HYPERLIPIDEMIA LDL GOAL <130: ICD-10-CM

## 2022-04-03 DIAGNOSIS — E06.3 HYPOTHYROIDISM DUE TO HASHIMOTO'S THYROIDITIS: ICD-10-CM

## 2022-04-03 DIAGNOSIS — I10 BENIGN HYPERTENSION: ICD-10-CM

## 2022-04-05 RX ORDER — AMLODIPINE BESYLATE 10 MG/1
10 TABLET ORAL DAILY
Qty: 90 TABLET | Refills: 0 | Status: SHIPPED | OUTPATIENT
Start: 2022-04-05 | End: 2022-07-17

## 2022-04-05 RX ORDER — SIMVASTATIN 20 MG
20 TABLET ORAL AT BEDTIME
Qty: 90 TABLET | Refills: 0 | Status: SHIPPED | OUTPATIENT
Start: 2022-04-05 | End: 2022-07-17

## 2022-04-05 RX ORDER — LEVOTHYROXINE SODIUM 100 UG/1
100 TABLET ORAL DAILY
Qty: 90 TABLET | Refills: 0 | Status: SHIPPED | OUTPATIENT
Start: 2022-04-05 | End: 2022-07-17

## 2022-04-05 RX ORDER — LISINOPRIL AND HYDROCHLOROTHIAZIDE 20; 25 MG/1; MG/1
1 TABLET ORAL DAILY
Qty: 90 TABLET | Refills: 0 | Status: SHIPPED | OUTPATIENT
Start: 2022-04-05 | End: 2022-07-17

## 2022-04-05 NOTE — TELEPHONE ENCOUNTER
Routing refill request to provider for review/approval because:  Drug interaction warning.  Pended for approval.  Zoya Mortensen RN  [facility]

## 2022-04-13 ENCOUNTER — OFFICE VISIT (OUTPATIENT)
Dept: UROLOGY | Facility: CLINIC | Age: 62
End: 2022-04-13
Payer: COMMERCIAL

## 2022-04-13 VITALS — DIASTOLIC BLOOD PRESSURE: 76 MMHG | SYSTOLIC BLOOD PRESSURE: 147 MMHG | OXYGEN SATURATION: 98 % | HEART RATE: 62 BPM

## 2022-04-13 DIAGNOSIS — R97.20 ELEVATED PROSTATE SPECIFIC ANTIGEN (PSA): Primary | ICD-10-CM

## 2022-04-13 PROCEDURE — 76872 US TRANSRECTAL: CPT | Performed by: UROLOGY

## 2022-04-13 PROCEDURE — 55700 PR BIOPSY OF PROSTATE,NEEDLE/PUNCH: CPT | Performed by: UROLOGY

## 2022-04-13 PROCEDURE — 88305 TISSUE EXAM BY PATHOLOGIST: CPT | Mod: GC | Performed by: PATHOLOGY

## 2022-04-13 NOTE — PATIENT INSTRUCTIONS
"Precautions Following a Prostate Biopsy    There are four conditions that you should watch for after a prostate biopsy:    1. Excessive Pain  2. Bleeding irregularities (passing \"dime sized\" clots or if your urine looks like cranberry juice)  3. Fever of 100 degrees or more  4. If you are unable to urinate    * If you experience any discomfort following the biopsy, you may take Tylenol.  Do not take Aspirin unless specified by your physician.  If the discomfort becomes severe or uncontrolled by medication, contact the Urology Clinic or Urology Resident (after normal business hours).    * Do not be alarmed if you have some blood in your stool, in the urine, or ejaculate (semen).  This occurrence is normal and may last up to three (3) or four (4) days, usually intermittently.  Blood in the ejaculate (semen) may last several weeks, up to about a dozen ejaculations.  The blood in your ejaculate may appear as brown streaks, blood tinged, and immediately following a biopsy, it may appear bright red.    *If you run a fever above 100 degrees, call the Urology Clinic or Urology Resident (after normal business hours) immediately.  If you are unable to reach your physician or the Resident on call, go to the nearest emergency room.  Explain that you have had a transrectal biopsy of your prostate and what problems you are experiencing.    *You should attempt to urinate following your biopsy before you leave the clinic.  If you are unable to urinate four (4) to six (6) hours after you leave the clinic, you will need to contact the Urology Clinic or the Resident on call.  If you are unable to reach your physician or the Resident on call, go to the nearest emergency room.    If you have any questions or concerns after your biopsy, feel free to contact the Urology Clinic at [phone] during M-F, 8:00-5:00 business hours.  If you need to speak with someone after normal business hours, call [phone] and ask for the Resident " on call for Urology to be paged.

## 2022-04-13 NOTE — NURSING NOTE
Esau Avendano's goals for this visit include:   Chief Complaint   Patient presents with     Prostate Biopsy     Elevated PSA of 9.96       He requests these members of his care team be copied on today's visit information:     PCP: Toni Carrillo    Referring Provider:  No referring provider defined for this encounter.    BP (!) 147/76 (BP Location: Right arm, Patient Position: Sitting, Cuff Size: Adult Large)   Pulse 62   SpO2 98%     Do you need any medication refills at today's visit?     Judith Lozano LPN on 4/13/2022 at 10:28 AM

## 2022-04-13 NOTE — PROGRESS NOTES
Procedure Note    Pre-operative diagnosis: Elevated PSA    Post-operative diagnosis same   Procedure: Trans-rectal ultrasound and prostate biopsy.   Surgeon: Lyndon Courtney MD   Assistants(s): Judith Lozano.   Estimated blood loss: Minimal    Specimens: Standard 12-core prostate bx   Findings: As below      Esau Avendano  who is 62 year old  is in for his TRUS & Bx of the prostate.  Reason for the biopsy is:    Elevated PSA    Prostate MRI 8/13/21 was PIRADS 2, low suspicion. 69g    Lab Results   Component Value Date    PSA 9.96 01/18/2022    PSA 6.72 06/14/2021    PSA 6.15 10/27/2020        Procedure:  Pt was positioned in left lateral decubitus position.   NICHOLAS indicates a smooth, symmetrical prostate.   The trans-rectal ultrasound probe was inserted and the prostate examined with biplanar ultrasound.    His prostate measures approx. 78 cc.  Capsule: Distinct   SVs:  Normal   Calcifications:  None  Hypoechoic areas:protate is uniformly very heterogenous in appearance.        6 biopsies were taken form the right side and 6 biopsies were taken from the left side.  Right apex biopsy sampled circumscribed area of tissue that stood out on US.    No complications were noted. The patient was given post biopsy instructions to drink a big glass of fluids preferably water for the next 6-8hrs. Blood in the urine and/or stool might be expected over the next week or so and he may have bloody/rust colored semen with intercourse for up to a month or more.  He will call or return if he has voiding difficulties or high fevers.  I will contact him to review the Bx report.

## 2022-04-15 LAB
PATH REPORT.COMMENTS IMP SPEC: NORMAL
PATH REPORT.COMMENTS IMP SPEC: NORMAL
PATH REPORT.FINAL DX SPEC: NORMAL
PATH REPORT.GROSS SPEC: NORMAL
PATH REPORT.MICROSCOPIC SPEC OTHER STN: NORMAL
PATH REPORT.RELEVANT HX SPEC: NORMAL
PHOTO IMAGE: NORMAL

## 2022-04-18 ENCOUNTER — MYC MEDICAL ADVICE (OUTPATIENT)
Dept: UROLOGY | Facility: CLINIC | Age: 62
End: 2022-04-18
Payer: COMMERCIAL

## 2022-04-18 DIAGNOSIS — R97.20 ELEVATED PROSTATE SPECIFIC ANTIGEN (PSA): Primary | ICD-10-CM

## 2022-04-19 NOTE — CONFIDENTIAL NOTE
Patient reviewed my chart message on 4/18/22. Will close encounter at this time.    Renae Estrada RN, BSN

## 2022-07-16 DIAGNOSIS — I10 BENIGN HYPERTENSION: ICD-10-CM

## 2022-07-16 DIAGNOSIS — E06.3 HYPOTHYROIDISM DUE TO HASHIMOTO'S THYROIDITIS: ICD-10-CM

## 2022-07-16 DIAGNOSIS — E78.5 HYPERLIPIDEMIA LDL GOAL <130: ICD-10-CM

## 2022-07-17 RX ORDER — LISINOPRIL AND HYDROCHLOROTHIAZIDE 20; 25 MG/1; MG/1
TABLET ORAL
Qty: 90 TABLET | Refills: 0 | Status: SHIPPED | OUTPATIENT
Start: 2022-07-17 | End: 2022-07-19

## 2022-07-17 RX ORDER — LEVOTHYROXINE SODIUM 100 UG/1
TABLET ORAL
Qty: 90 TABLET | Refills: 0 | Status: SHIPPED | OUTPATIENT
Start: 2022-07-17 | End: 2022-07-19

## 2022-07-17 RX ORDER — SIMVASTATIN 20 MG
TABLET ORAL
Qty: 90 TABLET | Refills: 0 | Status: SHIPPED | OUTPATIENT
Start: 2022-07-17 | End: 2022-07-19

## 2022-07-17 RX ORDER — AMLODIPINE BESYLATE 10 MG/1
TABLET ORAL
Qty: 90 TABLET | Refills: 0 | Status: SHIPPED | OUTPATIENT
Start: 2022-07-17 | End: 2022-07-19

## 2022-07-17 NOTE — TELEPHONE ENCOUNTER
Medication is being filled for 1 time refill only due to:  Patient needs to be seen because need recheck.   Pt is scheduled next week.  Lona Forrest RN

## 2022-07-19 ENCOUNTER — OFFICE VISIT (OUTPATIENT)
Dept: FAMILY MEDICINE | Facility: CLINIC | Age: 62
End: 2022-07-19
Payer: COMMERCIAL

## 2022-07-19 VITALS
HEART RATE: 66 BPM | DIASTOLIC BLOOD PRESSURE: 72 MMHG | BODY MASS INDEX: 27.27 KG/M2 | HEIGHT: 62 IN | WEIGHT: 148.2 LBS | RESPIRATION RATE: 20 BRPM | SYSTOLIC BLOOD PRESSURE: 122 MMHG | OXYGEN SATURATION: 98 % | TEMPERATURE: 97.9 F

## 2022-07-19 DIAGNOSIS — E78.5 HYPERLIPIDEMIA LDL GOAL <130: ICD-10-CM

## 2022-07-19 DIAGNOSIS — E06.3 HYPOTHYROIDISM DUE TO HASHIMOTO'S THYROIDITIS: Primary | ICD-10-CM

## 2022-07-19 DIAGNOSIS — R97.20 ELEVATED PROSTATE SPECIFIC ANTIGEN (PSA): ICD-10-CM

## 2022-07-19 DIAGNOSIS — I10 BENIGN HYPERTENSION: ICD-10-CM

## 2022-07-19 LAB
ANION GAP SERPL CALCULATED.3IONS-SCNC: 6 MMOL/L (ref 3–14)
BUN SERPL-MCNC: 17 MG/DL (ref 7–30)
CALCIUM SERPL-MCNC: 9.7 MG/DL (ref 8.5–10.1)
CHLORIDE BLD-SCNC: 106 MMOL/L (ref 94–109)
CO2 SERPL-SCNC: 28 MMOL/L (ref 20–32)
CREAT SERPL-MCNC: 0.9 MG/DL (ref 0.66–1.25)
GFR SERPL CREATININE-BSD FRML MDRD: >90 ML/MIN/1.73M2
GLUCOSE BLD-MCNC: 95 MG/DL (ref 70–99)
POTASSIUM BLD-SCNC: 3.6 MMOL/L (ref 3.4–5.3)
PSA SERPL-MCNC: 9.41 UG/L (ref 0–4)
SODIUM SERPL-SCNC: 140 MMOL/L (ref 133–144)
TSH SERPL DL<=0.005 MIU/L-ACNC: 1.76 MU/L (ref 0.4–4)

## 2022-07-19 PROCEDURE — 84153 ASSAY OF PSA TOTAL: CPT | Performed by: PHYSICIAN ASSISTANT

## 2022-07-19 PROCEDURE — 36415 COLL VENOUS BLD VENIPUNCTURE: CPT | Performed by: PHYSICIAN ASSISTANT

## 2022-07-19 PROCEDURE — 84443 ASSAY THYROID STIM HORMONE: CPT | Performed by: PHYSICIAN ASSISTANT

## 2022-07-19 PROCEDURE — 99214 OFFICE O/P EST MOD 30 MIN: CPT | Performed by: PHYSICIAN ASSISTANT

## 2022-07-19 PROCEDURE — 80048 BASIC METABOLIC PNL TOTAL CA: CPT | Performed by: PHYSICIAN ASSISTANT

## 2022-07-19 RX ORDER — SIMVASTATIN 20 MG
20 TABLET ORAL AT BEDTIME
Qty: 90 TABLET | Refills: 1 | Status: SHIPPED | OUTPATIENT
Start: 2022-07-19 | End: 2023-04-18

## 2022-07-19 RX ORDER — LISINOPRIL AND HYDROCHLOROTHIAZIDE 20; 25 MG/1; MG/1
1 TABLET ORAL DAILY
Qty: 90 TABLET | Refills: 1 | Status: SHIPPED | OUTPATIENT
Start: 2022-07-19 | End: 2023-04-18

## 2022-07-19 RX ORDER — AMLODIPINE BESYLATE 10 MG/1
10 TABLET ORAL DAILY
Qty: 90 TABLET | Refills: 1 | Status: SHIPPED | OUTPATIENT
Start: 2022-07-19 | End: 2023-04-18

## 2022-07-19 RX ORDER — LEVOTHYROXINE SODIUM 100 UG/1
100 TABLET ORAL DAILY
Qty: 90 TABLET | Refills: 1 | Status: SHIPPED | OUTPATIENT
Start: 2022-07-19 | End: 2023-04-18

## 2022-07-19 ASSESSMENT — PAIN SCALES - GENERAL: PAINLEVEL: NO PAIN (1)

## 2022-07-19 NOTE — PROGRESS NOTES
"      Delmy Prado is a 62 year old, presenting for the following health issues:  Hypertension (recheck), Thyroid Disease (recheck), and Health Maintenance (Patient will check on insurance coverage for shingles vaccine)      History of Present Illness       Hypertension: He presents for follow up of hypertension.  He does not check blood pressure  regularly outside of the clinic. Outside blood pressures have been over 140/90. He follows a low salt diet.     Hypothyroidism:     Since last visit, patient describes the following symptoms::  None   minimal weight gain. No profound bowel changes. No fatigue.   Active with disc golf.  No chest pain/sob/palpitations/dizziness/ha's  Some dec in urine stream.     Review of Systems   Constitutional, HEENT, cardiovascular, pulmonary, GI, , musculoskeletal, neuro, skin, endocrine and psych systems are negative, except as otherwise noted.      Objective    /72   Pulse 66   Temp 97.9  F (36.6  C) (Tympanic)   Resp 20   Ht 1.575 m (5' 2\")   Wt 67.2 kg (148 lb 3.2 oz)   SpO2 98%   BMI 27.11 kg/m    Body mass index is 27.11 kg/m .  Physical Exam   Eye exam - right eye normal lid, conjunctiva, cornea, pupil and fundus, left eye normal lid, conjunctiva, cornea, pupil and fundus.  Thyroid not palpable, not enlarged, no nodules detected.  CHEST:chest clear to IPPA, no tachypnea, retractions or cyanosis and S1, S2 normal, no murmur, no gallop, rate regular.  Pulses normal. No edema.  Esau was seen today for hypertension, thyroid disease and health maintenance.    Diagnoses and all orders for this visit:    Hypothyroidism due to Hashimoto's thyroiditis  -     levothyroxine (SYNTHROID/LEVOTHROID) 100 MCG tablet; Take 1 tablet (100 mcg) by mouth daily  -     TSH; Future    Benign hypertension  -     BASIC METABOLIC PANEL; Future  -     amLODIPine (NORVASC) 10 MG tablet; Take 1 tablet (10 mg) by mouth daily  -     lisinopril-hydrochlorothiazide (ZESTORETIC) 20-25 MG " tablet; Take 1 tablet by mouth daily    Elevated prostate specific antigen (PSA)  -     PSA, tumor marker; Future    Hyperlipidemia LDL goal <130  -     simvastatin (ZOCOR) 20 MG tablet; Take 1 tablet (20 mg) by mouth At Bedtime    Other orders  -     REVIEW OF HEALTH MAINTENANCE PROTOCOL ORDERS      Continue all current meds. work on lifestyle modification  Recheck in 6 mos.  Discussed tylenol and glucosamine for his foot and hand arthritis.       .  ..

## 2022-10-22 ENCOUNTER — HEALTH MAINTENANCE LETTER (OUTPATIENT)
Age: 62
End: 2022-10-22

## 2022-11-09 ENCOUNTER — MYC MEDICAL ADVICE (OUTPATIENT)
Dept: FAMILY MEDICINE | Facility: CLINIC | Age: 62
End: 2022-11-09

## 2022-11-10 NOTE — TELEPHONE ENCOUNTER
Patient called back and appointment scheduled.     Future Office Visit:   Next 5 appointments (look out 90 days)    Nov 14, 2022 10:00 AM  (Arrive by 9:40 AM)  Provider Visit with Toni Carrillo PA-C  [facility] [place] ([facility] [place] ) [id number]  [place] [address]  [phone]           Manjula Peace RN BSN  [facility]

## 2022-11-10 NOTE — TELEPHONE ENCOUNTER
Left message on voice mail for patient to call clinic.   [phone]    Manjula Peace RN BSN  [facility]

## 2022-11-11 NOTE — PROGRESS NOTES
"      Delmy Prado is a 62 year old, presenting for the following health issues:  Knee Pain (Left sided pain x 3 weeks)      History of Present Illness       Reason for visit:  Knee pain  Symptom onset:  3-4 weeks ago  Symptoms include:  Knee pain  Symptom intensity:  Moderate  Symptom progression:  Staying the same  Had these symptoms before:  No  What makes it worse:  Bending my knee  What makes it better:  Not using it    He eats 2-3 servings of fruits and vegetables daily.He consumes 0 sweetened beverage(s) daily.He exercises with enough effort to increase his heart rate 9 or less minutes per day.  He exercises with enough effort to increase his heart rate 3 or less days per week.   He is taking medications regularly.     Recheck of his htn.  No chest pain/sob/palpitations/dizziness/ha's    Left knee pain . Posterolateral pain. No swelling. No locking or catching.   No obvious injury.  Better with activity.     Review of Systems   Constitutional, HEENT, cardiovascular, pulmonary, GI, , musculoskeletal, neuro, skin, endocrine and psych systems are negative, except as otherwise noted.      Objective    /56   Pulse 77   Temp 97.4  F (36.3  C) (Tympanic)   Resp 20   Ht 1.575 m (5' 2\")   Wt 67 kg (147 lb 12.8 oz)   SpO2 99%   BMI 27.03 kg/m    Body mass index is 27.03 kg/m .  Physical Exam     CHEST:chest clear to IPPA, no tachypnea, retractions or cyanosis and S1, S2 normal, no murmur, no gallop, rate regular.  KNEE: The injured knee reveals antalgic gait, reduced range of motion, negative drawer sign, collateral ligaments intact, positive Dixon sign. X-ray is negative for fracture.    Esau was seen today for knee pain.    Diagnoses and all orders for this visit:    Acute pain of left knee  -     XR Knee Left 1/2 Views; Future    Benign hypertension      Esau was seen today for knee pain.    Diagnoses and all orders for this visit:    Acute pain of left knee  -     XR Knee Left 1/2 Views; " Future  -     diclofenac (VOLTAREN) 75 MG EC tablet; Take 1 tablet (75 mg) by mouth 2 times daily    Benign hypertension      work on lifestyle modification  Advised supportive and symptomatic treatment.  Follow up with Provider - if condition persists or worsens.

## 2022-11-14 ENCOUNTER — OFFICE VISIT (OUTPATIENT)
Dept: FAMILY MEDICINE | Facility: CLINIC | Age: 62
End: 2022-11-14
Payer: COMMERCIAL

## 2022-11-14 ENCOUNTER — ANCILLARY PROCEDURE (OUTPATIENT)
Dept: GENERAL RADIOLOGY | Facility: CLINIC | Age: 62
End: 2022-11-14
Attending: PHYSICIAN ASSISTANT
Payer: COMMERCIAL

## 2022-11-14 VITALS
SYSTOLIC BLOOD PRESSURE: 120 MMHG | WEIGHT: 147.8 LBS | HEIGHT: 62 IN | HEART RATE: 77 BPM | RESPIRATION RATE: 20 BRPM | BODY MASS INDEX: 27.2 KG/M2 | DIASTOLIC BLOOD PRESSURE: 56 MMHG | OXYGEN SATURATION: 99 % | TEMPERATURE: 97.4 F

## 2022-11-14 DIAGNOSIS — M25.562 ACUTE PAIN OF LEFT KNEE: ICD-10-CM

## 2022-11-14 DIAGNOSIS — I10 BENIGN HYPERTENSION: ICD-10-CM

## 2022-11-14 DIAGNOSIS — M25.562 ACUTE PAIN OF LEFT KNEE: Primary | ICD-10-CM

## 2022-11-14 PROCEDURE — 99213 OFFICE O/P EST LOW 20 MIN: CPT | Performed by: PHYSICIAN ASSISTANT

## 2022-11-14 PROCEDURE — 73560 X-RAY EXAM OF KNEE 1 OR 2: CPT | Mod: TC | Performed by: RADIOLOGY

## 2022-11-14 RX ORDER — DICLOFENAC SODIUM 75 MG/1
75 TABLET, DELAYED RELEASE ORAL 2 TIMES DAILY
Qty: 30 TABLET | Refills: 1 | Status: SHIPPED | OUTPATIENT
Start: 2022-11-14 | End: 2023-04-18

## 2022-11-14 ASSESSMENT — PAIN SCALES - GENERAL: PAINLEVEL: MILD PAIN (2)

## 2023-01-11 ENCOUNTER — THERAPY VISIT (OUTPATIENT)
Dept: PHYSICAL THERAPY | Facility: CLINIC | Age: 63
End: 2023-01-11
Attending: PHYSICIAN ASSISTANT
Payer: COMMERCIAL

## 2023-01-11 DIAGNOSIS — M25.562 ACUTE PAIN OF LEFT KNEE: ICD-10-CM

## 2023-01-11 DIAGNOSIS — M25.562 LEFT KNEE PAIN: ICD-10-CM

## 2023-01-11 PROCEDURE — 97161 PT EVAL LOW COMPLEX 20 MIN: CPT | Mod: GP | Performed by: PHYSICAL THERAPIST

## 2023-01-11 PROCEDURE — 97110 THERAPEUTIC EXERCISES: CPT | Mod: GP | Performed by: PHYSICAL THERAPIST

## 2023-01-11 ASSESSMENT — ACTIVITIES OF DAILY LIVING (ADL)
AS_A_RESULT_OF_YOUR_KNEE_INJURY,_HOW_WOULD_YOU_RATE_YOUR_CURRENT_LEVEL_OF_DAILY_ACTIVITY?: NEARLY NORMAL
KNEEL ON THE FRONT OF YOUR KNEE: ACTIVITY IS VERY DIFFICULT
GIVING WAY, BUCKLING OR SHIFTING OF KNEE: I HAVE THE SYMPTOM BUT IT DOES NOT AFFECT MY ACTIVITY
GO UP STAIRS: ACTIVITY IS MINIMALLY DIFFICULT
STIFFNESS: THE SYMPTOM AFFECTS MY ACTIVITY SLIGHTLY
RAW_SCORE: 42
SWELLING: THE SYMPTOM AFFECTS MY ACTIVITY MODERATELY
GO DOWN STAIRS: ACTIVITY IS MINIMALLY DIFFICULT
WEAKNESS: THE SYMPTOM AFFECTS MY ACTIVITY SLIGHTLY
HOW_WOULD_YOU_RATE_THE_CURRENT_FUNCTION_OF_YOUR_KNEE_DURING_YOUR_USUAL_DAILY_ACTIVITIES_ON_A_SCALE_FROM_0_TO_100_WITH_100_BEING_YOUR_LEVEL_OF_KNEE_FUNCTION_PRIOR_TO_YOUR_INJURY_AND_0_BEING_THE_INABILITY_TO_PERFORM_ANY_OF_YOUR_USUAL_DAILY_ACTIVITIES?: 70
HOW_WOULD_YOU_RATE_THE_OVERALL_FUNCTION_OF_YOUR_KNEE_DURING_YOUR_USUAL_DAILY_ACTIVITIES?: ABNORMAL
KNEE_ACTIVITY_OF_DAILY_LIVING_SUM: 42
SQUAT: ACTIVITY IS FAIRLY DIFFICULT
LIMPING: THE SYMPTOM AFFECTS MY ACTIVITY SLIGHTLY
KNEE_ACTIVITY_OF_DAILY_LIVING_SCORE: 60
PAIN: THE SYMPTOM AFFECTS MY ACTIVITY MODERATELY
STAND: ACTIVITY IS NOT DIFFICULT
RISE FROM A CHAIR: ACTIVITY IS MINIMALLY DIFFICULT
WALK: ACTIVITY IS MINIMALLY DIFFICULT
SIT WITH YOUR KNEE BENT: ACTIVITY IS VERY DIFFICULT

## 2023-01-11 NOTE — PROGRESS NOTES
Physical Therapy Initial Evaluation  Subjective:  The history is provided by the patient. No  was used.   Patient Health History  Esau Avendano being seen for Knee pain.     Problem began: 11/14/2022.   Problem occurred: I dont know   Pain is reported as 3/10 and 4/10 on pain scale.  General health as reported by patient is good.  Pertinent medical history includes: high blood pressure and thyroid problems.     Medical allergies: other. Other medical allergies details: Augmentin.       Current medications:  High blood pressure medication and thyroid medication.    Current occupation is [employer].   Primary job tasks include:  Prolonged standing and repetitive tasks.                  Therapist Generated HPI Evaluation         Type of problem:  Left knee.    This is a new condition.  Condition occurred with:  Insidious onset.  Where condition occurred: for unknown reasons.  Patient reports pain:  Posterior and anterior.  Pain is described as aching and is constant.    Since onset symptoms are gradually improving.  Associated symptoms:  Loss of motion/stiffness. Symptoms are exacerbated by walking, standing, certain positions, descending stairs and ascending stairs  Relieved by: pain medication.  Special tests included:  X-ray.    Restrictions due to condition include:  Working in normal job without restrictions.  Barriers include:  None as reported by patient.                        Objective:    Gait:    Gait Type:  Antalgic   Weight Bearing Status:  WBAT   Assistive Devices:  None  Deviations:  Knee:  Knee flexion decr L                                                 Hip Evaluation    Hip Strength:    Flexion:   Left: 5/5    Pain: strong/pain free  Right: 5/5    Pain: strong/pain free                    Extension:  Left: 4/5   Pain:strong/pain freeRight: 4/5     Pain: strong/pain free    Abduction:  Left: 3+/5      Pain:weak/pain freeRight: 3+/5     Pain:weak/pain free  Adduction:  Left:  /5   Pain:weak/pain free                         Knee Evaluation:  ROM:    AROM    Hyperextension:  Left:  5*    Right: 5*  Extension:  Left: 0*    Right:  0*  Flexion: Left: 105    Right: 125  PROM        Flexion: Left: 110*   Right:   Pain: pain with L knee flexion during and at end range  Endfeel: firm end feel L knee flexion  Strength:     Extension:  Left: 4/5   Strong/painful  Pain:      Right: 5/5   Strong/pain free  Pain:  Flexion:  Left: 5/5   Strong/pain free  Pain:      Right: 5/5   Strong/pain free  Pain:    Quad Set Left:  Fair    Pain: -   Quad Set Right:  Fair    Pain: -  Ligament Testing:    Varus 0:  Left:  Neg   Right:  Neg  Varus 30:  Left:  Neg  Right:  Neg  Valgus 0:  Left:  Neg  Right:  Neg  Valgus 30:  Left:  Neg    Right:  Neg  Anterior Drawer:  Left:  Neg    Right:  Neg  Posterior Drawer: Left:  Neg  Right:  Neg                  General     ROS    Assessment/Plan:    Patient is a 62 year old male with left side knee complaints.    Patient has the following significant findings with corresponding treatment plan.                Diagnosis 1:  L Knee Pain  Pain -  self management, education and home program  Decreased ROM/flexibility - manual therapy and therapeutic exercise  Decreased strength - therapeutic exercise and therapeutic activities  Impaired gait - gait training  Decreased function - therapeutic activities    Therapy Evaluation Codes:   1) History comprised of:   Personal factors that impact the plan of care:      None.    Comorbidity factors that impact the plan of care are:      None.     Medications impacting care: None.  2) Examination of Body Systems comprised of:   Body structures and functions that impact the plan of care:      L Knee and L ankle.   Activity limitations that impact the plan of care are:      standing, walking, squatting, stairs.  3) Clinical presentation characteristics are:   Stable/Uncomplicated.  4) Decision-Making    Low complexity using standardized  patient assessment instrument and/or measureable assessment of functional outcome.  Cumulative Therapy Evaluation is: Low complexity.    Previous and current functional limitations:  (See Goal Flow Sheet for this information)    Short term and Long term goals: (See Goal Flow Sheet for this information)     Communication ability:  Patient appears to be able to clearly communicate and understand verbal and written communication and follow directions correctly.  Treatment Explanation - The following has been discussed with the patient:   RX ordered/plan of care  Anticipated outcomes  Possible risks and side effects  This patient would benefit from PT intervention to resume normal activities.   Rehab potential is excellent.    Frequency:  1 X week, once daily  Duration:  for 4 weeks  Discharge Plan:  Achieve all LTG.  Independent in home treatment program.  Reach maximal therapeutic benefit.    Please refer to the daily flowsheet for treatment today, total treatment time and time spent performing 1:1 timed codes.

## 2023-04-01 ENCOUNTER — HEALTH MAINTENANCE LETTER (OUTPATIENT)
Age: 63
End: 2023-04-01

## 2023-04-18 ENCOUNTER — OFFICE VISIT (OUTPATIENT)
Dept: FAMILY MEDICINE | Facility: CLINIC | Age: 63
End: 2023-04-18
Payer: COMMERCIAL

## 2023-04-18 VITALS
TEMPERATURE: 97.3 F | HEART RATE: 70 BPM | WEIGHT: 143.2 LBS | HEIGHT: 62 IN | BODY MASS INDEX: 26.35 KG/M2 | OXYGEN SATURATION: 99 % | DIASTOLIC BLOOD PRESSURE: 64 MMHG | RESPIRATION RATE: 16 BRPM | SYSTOLIC BLOOD PRESSURE: 126 MMHG

## 2023-04-18 DIAGNOSIS — I10 BENIGN HYPERTENSION: ICD-10-CM

## 2023-04-18 DIAGNOSIS — R39.14 BENIGN PROSTATIC HYPERPLASIA WITH INCOMPLETE BLADDER EMPTYING: ICD-10-CM

## 2023-04-18 DIAGNOSIS — N40.1 BENIGN PROSTATIC HYPERPLASIA WITH INCOMPLETE BLADDER EMPTYING: ICD-10-CM

## 2023-04-18 DIAGNOSIS — E06.3 HYPOTHYROIDISM DUE TO HASHIMOTO'S THYROIDITIS: Primary | ICD-10-CM

## 2023-04-18 DIAGNOSIS — Z12.11 SCREEN FOR COLON CANCER: ICD-10-CM

## 2023-04-18 DIAGNOSIS — R97.20 ELEVATED PROSTATE SPECIFIC ANTIGEN (PSA): ICD-10-CM

## 2023-04-18 DIAGNOSIS — E78.5 HYPERLIPIDEMIA LDL GOAL <130: ICD-10-CM

## 2023-04-18 DIAGNOSIS — G47.09 INITIAL INSOMNIA: ICD-10-CM

## 2023-04-18 LAB
ANION GAP SERPL CALCULATED.3IONS-SCNC: 4 MMOL/L (ref 3–14)
BUN SERPL-MCNC: 14 MG/DL (ref 7–30)
CALCIUM SERPL-MCNC: 9.9 MG/DL (ref 8.5–10.1)
CHLORIDE BLD-SCNC: 100 MMOL/L (ref 94–109)
CHOLEST SERPL-MCNC: 149 MG/DL
CO2 SERPL-SCNC: 28 MMOL/L (ref 20–32)
CREAT SERPL-MCNC: 0.86 MG/DL (ref 0.66–1.25)
FASTING STATUS PATIENT QL REPORTED: YES
GFR SERPL CREATININE-BSD FRML MDRD: >90 ML/MIN/1.73M2
GLUCOSE BLD-MCNC: 107 MG/DL (ref 70–99)
HDLC SERPL-MCNC: 79 MG/DL
LDLC SERPL CALC-MCNC: 53 MG/DL
NONHDLC SERPL-MCNC: 70 MG/DL
POTASSIUM BLD-SCNC: 3.9 MMOL/L (ref 3.4–5.3)
PSA SERPL-MCNC: 9.85 UG/L (ref 0–4)
SODIUM SERPL-SCNC: 132 MMOL/L (ref 133–144)
TRIGL SERPL-MCNC: 86 MG/DL
TSH SERPL DL<=0.005 MIU/L-ACNC: 1.33 MU/L (ref 0.4–4)

## 2023-04-18 PROCEDURE — 99214 OFFICE O/P EST MOD 30 MIN: CPT | Mod: 25 | Performed by: PHYSICIAN ASSISTANT

## 2023-04-18 PROCEDURE — 80061 LIPID PANEL: CPT | Performed by: PHYSICIAN ASSISTANT

## 2023-04-18 PROCEDURE — 90471 IMMUNIZATION ADMIN: CPT | Performed by: PHYSICIAN ASSISTANT

## 2023-04-18 PROCEDURE — 80048 BASIC METABOLIC PNL TOTAL CA: CPT | Performed by: PHYSICIAN ASSISTANT

## 2023-04-18 PROCEDURE — 36415 COLL VENOUS BLD VENIPUNCTURE: CPT | Performed by: PHYSICIAN ASSISTANT

## 2023-04-18 PROCEDURE — 84153 ASSAY OF PSA TOTAL: CPT | Performed by: PHYSICIAN ASSISTANT

## 2023-04-18 PROCEDURE — 84443 ASSAY THYROID STIM HORMONE: CPT | Performed by: PHYSICIAN ASSISTANT

## 2023-04-18 PROCEDURE — 90715 TDAP VACCINE 7 YRS/> IM: CPT | Performed by: PHYSICIAN ASSISTANT

## 2023-04-18 RX ORDER — SIMVASTATIN 20 MG
20 TABLET ORAL AT BEDTIME
Qty: 90 TABLET | Refills: 1 | Status: SHIPPED | OUTPATIENT
Start: 2023-04-18 | End: 2023-10-17

## 2023-04-18 RX ORDER — TRAZODONE HYDROCHLORIDE 50 MG/1
50-100 TABLET, FILM COATED ORAL AT BEDTIME
Qty: 60 TABLET | Refills: 0 | Status: SHIPPED | OUTPATIENT
Start: 2023-04-18 | End: 2023-10-17

## 2023-04-18 RX ORDER — TAMSULOSIN HYDROCHLORIDE 0.4 MG/1
0.4 CAPSULE ORAL EVERY EVENING
Qty: 30 CAPSULE | Refills: 0 | Status: SHIPPED | OUTPATIENT
Start: 2023-04-18 | End: 2023-10-17

## 2023-04-18 RX ORDER — AMLODIPINE BESYLATE 10 MG/1
10 TABLET ORAL DAILY
Qty: 90 TABLET | Refills: 1 | Status: SHIPPED | OUTPATIENT
Start: 2023-04-18 | End: 2023-10-17

## 2023-04-18 RX ORDER — LEVOTHYROXINE SODIUM 100 UG/1
100 TABLET ORAL DAILY
Qty: 90 TABLET | Refills: 1 | Status: SHIPPED | OUTPATIENT
Start: 2023-04-18 | End: 2023-10-17

## 2023-04-18 RX ORDER — LISINOPRIL AND HYDROCHLOROTHIAZIDE 20; 25 MG/1; MG/1
1 TABLET ORAL DAILY
Qty: 90 TABLET | Refills: 1 | Status: SHIPPED | OUTPATIENT
Start: 2023-04-18 | End: 2023-10-17

## 2023-04-18 ASSESSMENT — PAIN SCALES - GENERAL: PAINLEVEL: MILD PAIN (2)

## 2023-04-18 NOTE — PROGRESS NOTES
Delmy Prado is a 63 year old, presenting for the following health issues:  RECHECK        4/18/2023     6:58 AM   Additional Questions   Roomed by Ashley     History of Present Illness       Hyperlipidemia:  He presents for follow up of hyperlipidemia.  He is taking medication to lower cholesterol. He is not having myalgia or other side effects to statin medications.    Hypertension: He presents for follow up of hypertension.  He does not check blood pressure  regularly outside of the clinic. Outpatient blood pressures have not been over 140/90. He follows a low salt diet.     Hypothyroidism:     Since last visit, patient describes the following symptoms::  None    He eats 0-1 servings of fruits and vegetables daily.He consumes 0 sweetened beverage(s) daily.He exercises with enough effort to increase his heart rate 9 or less minutes per day.  He exercises with enough effort to increase his heart rate 3 or less days per week.   He is taking medications regularly.       Hyperlipidemia Follow-Up      Are you regularly taking any medication or supplement to lower your cholesterol?   Yes- Simvastatin    Are you having muscle aches or other side effects that you think could be caused by your cholesterol lowering medication?  No    Hypertension Follow-up      Do you check your blood pressure regularly outside of the clinic? Yes     Are you following a low salt diet? Yes    Are your blood pressures ever more than 140 on the top number (systolic) OR more   than 90 on the bottom number (diastolic), for example 140/90? No  No chest pain/sob/palpitations/dizziness/ha's    Recheck of his thyroid dx. No constipation or concerning weight changes.    Some initial insomnia and middle insomnia. Work stress and trying to sell home is motivating this.   History of elevated psa. Some obstructive voiding symptoms. Prior Biopsies negative   Review of Systems   Constitutional, HEENT, cardiovascular, pulmonary, GI, ,  "musculoskeletal, neuro, skin, endocrine and psych systems are negative, except as otherwise noted.      Objective    /64   Pulse 70   Temp 97.3  F (36.3  C) (Temporal)   Resp 16   Ht 1.575 m (5' 2\")   Wt 65 kg (143 lb 3.2 oz)   SpO2 99%   BMI 26.19 kg/m    Body mass index is 26.19 kg/m .  Physical Exam   Eye exam - right eye normal lid, conjunctiva, cornea, pupil and fundus, left eye normal lid, conjunctiva, cornea, pupil and fundus.  Thyroid not palpable, not enlarged, no nodules detected.  CHEST:chest clear to IPPA, no tachypnea, retractions or cyanosis and S1, S2 normal, no murmur, no gallop, rate regular.    Esau was seen today for recheck.    Diagnoses and all orders for this visit:    Hypothyroidism due to Hashimoto's thyroiditis  -     TSH; Future  -     levothyroxine (SYNTHROID/LEVOTHROID) 100 MCG tablet; Take 1 tablet (100 mcg) by mouth daily    Screen for colon cancer  -     Fecal colorectal cancer screen FIT - Future (S+30); Future    Benign hypertension  -     BASIC METABOLIC PANEL; Future  -     amLODIPine (NORVASC) 10 MG tablet; Take 1 tablet (10 mg) by mouth daily  -     lisinopril-hydrochlorothiazide (ZESTORETIC) 20-25 MG tablet; Take 1 tablet by mouth daily    Initial insomnia  -     traZODone (DESYREL) 50 MG tablet; Take 1-2 tablets ( mg) by mouth At Bedtime    Hyperlipidemia LDL goal <130  -     simvastatin (ZOCOR) 20 MG tablet; Take 1 tablet (20 mg) by mouth At Bedtime  -     Lipid panel reflex to direct LDL Fasting; Future    Elevated prostate specific antigen (PSA)  -     PSA, tumor marker; Future    Benign prostatic hyperplasia with incomplete bladder emptying  -     tamsulosin (FLOMAX) 0.4 MG capsule; Take 1 capsule (0.4 mg) by mouth every evening    Other orders  -     TDAP VACCINE (Adacel, Boostrix)      Continue current meds.  Exercise  Lower fat and salt diet.  Recheck in 6 mos     Prior to immunization administration, verified patients identity using patient s name " and date of birth. Please see Immunization Activity for additional information.     Screening Questionnaire for Adult Immunization    Are you sick today?   No   Do you have allergies to medications, food, a vaccine component or latex?   No   Have you ever had a serious reaction after receiving a vaccination?   No   Do you have a long-term health problem with heart, lung, kidney, or metabolic disease (e.g., diabetes), asthma, a blood disorder, no spleen, complement component deficiency, a cochlear implant, or a spinal fluid leak?  Are you on long-term aspirin therapy?   No   Do you have cancer, leukemia, HIV/AIDS, or any other immune system problem?   No   Do you have a parent, brother, or sister with an immune system problem?   No   In the past 3 months, have you taken medications that affect  your immune system, such as prednisone, other steroids, or anticancer drugs; drugs for the treatment of rheumatoid arthritis, Crohn s disease, or psoriasis; or have you had radiation treatments?   No   Have you had a seizure, or a brain or other nervous system problem?   No   During the past year, have you received a transfusion of blood or blood    products, or been given immune (gamma) globulin or antiviral drug?   No   For women: Are you pregnant or is there a chance you could become       pregnant during the next month?   No   Have you received any vaccinations in the past 4 weeks?   No     Immunization questionnaire answers were all negative.      Injection of Tdap given by Ashley Mccurdy MA. Patient instructed to remain in clinic for 15 minutes afterwards, and to report any adverse reactions.     Screening performed by Ashley Mccurdy MA on 4/18/2023 at 7:42 AM.

## 2023-04-19 ENCOUNTER — MYC MEDICAL ADVICE (OUTPATIENT)
Dept: FAMILY MEDICINE | Facility: CLINIC | Age: 63
End: 2023-04-19
Payer: COMMERCIAL

## 2023-04-24 ENCOUNTER — OFFICE VISIT (OUTPATIENT)
Dept: FAMILY MEDICINE | Facility: CLINIC | Age: 63
End: 2023-04-24
Payer: COMMERCIAL

## 2023-04-24 VITALS
BODY MASS INDEX: 26.94 KG/M2 | RESPIRATION RATE: 18 BRPM | HEART RATE: 68 BPM | SYSTOLIC BLOOD PRESSURE: 138 MMHG | HEIGHT: 62 IN | WEIGHT: 146.4 LBS | TEMPERATURE: 97.2 F | DIASTOLIC BLOOD PRESSURE: 52 MMHG | OXYGEN SATURATION: 97 %

## 2023-04-24 DIAGNOSIS — M25.562 ACUTE PAIN OF LEFT KNEE: Primary | ICD-10-CM

## 2023-04-24 PROCEDURE — 20610 DRAIN/INJ JOINT/BURSA W/O US: CPT | Mod: LT | Performed by: PHYSICIAN ASSISTANT

## 2023-04-24 RX ORDER — METHYLPREDNISOLONE ACETATE 40 MG/ML
40 INJECTION, SUSPENSION INTRA-ARTICULAR; INTRALESIONAL; INTRAMUSCULAR; SOFT TISSUE ONCE
Status: COMPLETED | OUTPATIENT
Start: 2023-04-24 | End: 2023-04-24

## 2023-04-24 RX ADMIN — METHYLPREDNISOLONE ACETATE 40 MG: 40 INJECTION, SUSPENSION INTRA-ARTICULAR; INTRALESIONAL; INTRAMUSCULAR; SOFT TISSUE at 17:30

## 2023-04-24 ASSESSMENT — PAIN SCALES - GENERAL: PAINLEVEL: MILD PAIN (3)

## 2023-04-24 NOTE — PROGRESS NOTES
"      Subjective   Johan is a 63 year old, presenting for the following health issues:  Knee Pain (Would like knee injection (Left))        4/24/2023     4:56 PM   Additional Questions   Roomed by Robert BERMUDEZ   Accompanied by CLEMENT         4/24/2023     4:56 PM   Patient Reported Additional Medications   Patient reports taking the following new medications NA     HPI     Left Knee pain  - would like injection        Review of Systems   Constitutional, HEENT, cardiovascular, pulmonary, GI, , musculoskeletal, neuro, skin, endocrine and psych systems are negative, except as otherwise noted.      Objective    /52   Pulse 68   Temp 97.2  F (36.2  C) (Temporal)   Resp 18   Ht 1.58 m (5' 2.21\")   Wt 66.4 kg (146 lb 6.4 oz)   SpO2 97%   BMI 26.60 kg/m    Body mass index is 26.6 kg/m .  Physical Exam        The risks, benefits and potential complications (including but not limited to, bleeding, infection, pain, scar, damage to adjacent structures, atrophy or necrosis of soft tissue, skin blanching, failure to relieve symptoms) of injection were discussed with the patient. Questions were addressed and answered.The patient elected to proceed. Written informed consent was obtained. The correct procedural site was identified and confirmed. A Left Knee intraarticular injection was performed using 2mL Depo Medrol 40mg per mL and 4mL (0.25% marcaine) of local anesthetic after sterile prep, to the correct procedural site. Sterile bandaid applied. This was tolerated well by the patient. No apparent complications.Did also discuss that if diabetic, recommend close monitoring of blood sugars over the next week as cortisone injections can temporarily elevate blood sugars.      Esau was seen today for knee pain.    Diagnoses and all orders for this visit:    Acute pain of left knee  -     methylPREDNISolone (DEPO-MEDROL) injection 40 mg  -     DRAIN/INJECT LARGE JOINT/BURSA          "

## 2023-08-11 ENCOUNTER — MYC MEDICAL ADVICE (OUTPATIENT)
Dept: FAMILY MEDICINE | Facility: CLINIC | Age: 63
End: 2023-08-11
Payer: COMMERCIAL

## 2023-08-11 DIAGNOSIS — M25.562 ACUTE PAIN OF LEFT KNEE: Primary | ICD-10-CM

## 2023-08-17 NOTE — TELEPHONE ENCOUNTER
Please print off left knee mri order and fax to [facility] in [place]. I believe they'll call frankie to schedule an appt.

## 2023-08-22 ENCOUNTER — TELEPHONE (OUTPATIENT)
Dept: FAMILY MEDICINE | Facility: CLINIC | Age: 63
End: 2023-08-22

## 2023-08-22 NOTE — TELEPHONE ENCOUNTER
Forms received from:  [facility]   Phone number listed: [phone]   Fax listed: [phone]  Date received: 8/18/23  Form description: Radiology external order   Once forms are completed, please return to [facility]  via fax.  Is patient requesting to be contacted when forms are completed: na  Phone: na  Form placed:  Toni Barreto

## 2023-08-28 ENCOUNTER — ANCILLARY PROCEDURE (OUTPATIENT)
Dept: MRI IMAGING | Facility: CLINIC | Age: 63
End: 2023-08-28
Attending: PHYSICIAN ASSISTANT
Payer: COMMERCIAL

## 2023-08-28 DIAGNOSIS — M25.562 ACUTE PAIN OF LEFT KNEE: ICD-10-CM

## 2023-08-28 PROCEDURE — 73721 MRI JNT OF LWR EXTRE W/O DYE: CPT | Mod: LT | Performed by: RADIOLOGY

## 2023-09-01 ENCOUNTER — MYC MEDICAL ADVICE (OUTPATIENT)
Dept: FAMILY MEDICINE | Facility: CLINIC | Age: 63
End: 2023-09-01
Payer: COMMERCIAL

## 2023-09-01 DIAGNOSIS — S83.207A TEAR OF MENISCUS OF LEFT KNEE AS CURRENT INJURY, UNSPECIFIED MENISCUS, UNSPECIFIED TEAR TYPE, INITIAL ENCOUNTER: ICD-10-CM

## 2023-09-01 DIAGNOSIS — M25.562 ACUTE PAIN OF LEFT KNEE: ICD-10-CM

## 2023-09-13 NOTE — PROGRESS NOTES
HISTORY OF PRESENT ILLNESS    Esau Avendano is a 63 year old male who is seen in consultation at the request of Toni Carrillo PA-C  for evaluation of  left knee pain that has been present approximately 11 months.      No known injury.    Just started hurting    Present symptoms:   Swelling  Gets catching, popping. Feeling of giving-way a couple of times. Can't trust the knee pivoting  Pain kneeling  Pain on stairs, on uneven ground.  Posterior knee.    Treatments tried to this point: NSAIDs  did some physical therapy.  Voltaren oral was great for a short time    Had a corticosteroid injection which helped a little bit but didn't last long.   MRI was done.     Orthopedic PMH: reports some ankle arthritis.    Patient Active Problem List   Diagnosis    Hypothyroidism    Hyperlipidemia LDL goal <130    Benign hypertension    Erectile dysfunction    Left knee pain      Other PMH:  has a past medical history of Arthritis (2019), Hypertension (2010), and Thyroid disease (1996).    He has no past medical history of Heart disease.    Surgical:  has a past surgical history that includes colonoscopy (2018) and hernia repair (2018).    Family Hx:  family history includes Cerebrovascular Disease in his mother; Hypertension in his mother.    Social Hx:  reports that he has never smoked. He has never been exposed to tobacco smoke. He has never used smokeless tobacco. He reports current alcohol use. He reports that he does not use drugs.    REVIEW OF SYSTEMS:    CONSTITUTIONAL:  NEGATIVE for fever, chills, change in weight  INTEGUMENTARY/SKIN:  NEGATIVE for worrisome rashes, moles or lesions  EYES:  NEGATIVE for vision changes or irritation  ENT/MOUTH:  NEGATIVE for ear, mouth and throat problems  RESP:  NEGATIVE for significant cough or SOB  BREAST:  NEGATIVE for masses, tenderness or discharge  CV:  NEGATIVE for chest pain, palpitations or peripheral edema  GI:  NEGATIVE for nausea, abdominal pain, heartburn, or change in bowel  "habits  :  Negative   MUSCULOSKELETAL:  See HPI above  NEURO:  NEGATIVE for weakness, dizziness or paresthesias  ENDOCRINE:  NEGATIVE for temperature intolerance, skin/hair changes  HEME/ALLERGY/IMMUNE:  NEGATIVE for bleeding problems  PSYCHIATRIC:  NEGATIVE for changes in mood or affect    PHYSICAL EXAM:  BP (!) 158/77 (BP Location: Left arm, Patient Position: Sitting, Cuff Size: Adult Regular)   Pulse 78   Ht 1.575 m (5' 2\")   Wt 66.2 kg (146 lb)   SpO2 98%   BMI 26.70 kg/m     GENERAL APPEARANCE: healthy, alert, and no distress   SKIN: no suspicious lesions or rashes  NEURO: Normal strength and tone, mentation intact, and speech normal  VASCULAR:  good pulses, and cappillary refill   LYMPH: no lymphadenopathy   PSYCH:  mentation appears normal and affect normal/bright  RESP: no increased work of breathing     KNEE EXAM:   Gait: walks with normal gait  Alignment: mild varus  Squat: 60 % painful.    Patellofemoral joint: mild crepitations in the patellofemoral joint.  Effusion: minimal   ROM: 0*-110*  Tender: medial joint line  Masses: none  Ligaments:  Lachman's Stable, Anterior and posterior drawer stable, stable to varus and valgus stress.  no pain with Varus/Valgus stress testing.    McMurrays: pain but no catching with hyperflexion    X-RAY:  XR KNEE LEFT 1/2 VIEWS 11/14/2022   1:   Mild-moderate medial joint space narrowing.   No acute fracture.  Chronic bone formation tibial tuberosity at the distal patellar tendon  insertion with adjacent soft tissue thickening. There is a joint  effusion.       MRI:  MR left knee without contrast 8/28/2023 7:48 AM    Third head of gastrocnemius, located lateral to the popliteus vessels.  Also separate muscle belly superficial to the tibial nerve i.e. the  segment of nerve coursing between the third head and this muscle  belly.                                                                    Impression:  1. Markedly truncated medial meniscal body with intermediate " signal  violating articular surface, may be due to prior partial menisectomy.  Please correlate with previous operative history. If not, finding  consistent with tear.  2. Lateral meniscus body tear with 10 mm meniscal flap in the lateral  femoral gutter.  3. Mucoid/cystic degeneration of the anterior crucial ligament.  4. 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  5. Moderate knee joint effusion.  6. Anatomic variant of accessory gastrocnemius muscle bellies.     Impression: lateral meniscus tear with displaced flap  Degenerative medial meniscus tear   Osteoarthritis left knee. Mild varus    Plan:  Knee Arthroscopy OA: Discussed findings with patient.  We talked about treatment options.  The pain may be due to either arthritis or the meniscal tear; or a combination of both.  I feel the best treatment option due to the mechanical symptoms is to do arthroscopy.  Injections can be done later after the knee has been debrided.  I have explained the nature of the procedure, the risks and recovery time with the patient.      AMY Mcmahon MD  Dept. Orthopedic Surgery  [facility]

## 2023-09-14 ENCOUNTER — OFFICE VISIT (OUTPATIENT)
Dept: ORTHOPEDICS | Facility: CLINIC | Age: 63
End: 2023-09-14
Payer: COMMERCIAL

## 2023-09-14 ENCOUNTER — PREP FOR PROCEDURE (OUTPATIENT)
Dept: ORTHOPEDICS | Facility: CLINIC | Age: 63
End: 2023-09-14

## 2023-09-14 ENCOUNTER — TELEPHONE (OUTPATIENT)
Dept: ORTHOPEDICS | Facility: CLINIC | Age: 63
End: 2023-09-14

## 2023-09-14 VITALS
HEART RATE: 78 BPM | WEIGHT: 146 LBS | HEIGHT: 62 IN | SYSTOLIC BLOOD PRESSURE: 158 MMHG | OXYGEN SATURATION: 98 % | BODY MASS INDEX: 26.87 KG/M2 | DIASTOLIC BLOOD PRESSURE: 77 MMHG

## 2023-09-14 DIAGNOSIS — M17.12 PRIMARY OSTEOARTHRITIS OF LEFT KNEE: Primary | ICD-10-CM

## 2023-09-14 DIAGNOSIS — M23.304 DERANGEMENT OF MEDIAL MENISCUS OF LEFT KNEE: Primary | ICD-10-CM

## 2023-09-14 DIAGNOSIS — M25.562 ACUTE PAIN OF LEFT KNEE: ICD-10-CM

## 2023-09-14 DIAGNOSIS — S83.282D OTHER TEAR OF LATERAL MENISCUS OF LEFT KNEE AS CURRENT INJURY, SUBSEQUENT ENCOUNTER: ICD-10-CM

## 2023-09-14 DIAGNOSIS — S83.207A TEAR OF MENISCUS OF LEFT KNEE AS CURRENT INJURY, UNSPECIFIED MENISCUS, UNSPECIFIED TEAR TYPE, INITIAL ENCOUNTER: ICD-10-CM

## 2023-09-14 PROCEDURE — 99204 OFFICE O/P NEW MOD 45 MIN: CPT | Performed by: ORTHOPAEDIC SURGERY

## 2023-09-14 ASSESSMENT — PAIN SCALES - GENERAL: PAINLEVEL: MILD PAIN (2)

## 2023-09-14 NOTE — TELEPHONE ENCOUNTER
Left message for patient to call us back to discuss surgery dates    Shima Gordon M.A.  Specialty surgery Scheduler

## 2023-09-14 NOTE — TELEPHONE ENCOUNTER
Type of surgery: LEFT KNEE ARTHROSCOPIC MEDIAL/LATERAL MENISECTOMY   Location of surgery: [facility]

## 2023-09-14 NOTE — LETTER
9/14/2023         RE: Esau Avendano  [address] [place] [address]        Dear Colleague,    Thank you for referring your patient, Esau Avendano, to the [facility]. Please see a copy of my visit note below.    HISTORY OF PRESENT ILLNESS    Esau Avendano is a 63 year old male who is seen in consultation at the request of Toni Carrillo PA-C  for evaluation of  left knee pain that has been present approximately 11 months.      No known injury.    Just started hurting    Present symptoms:   Swelling  Gets catching, popping. Feeling of giving-way a couple of times. Can't trust the knee pivoting  Pain kneeling  Pain on stairs, on uneven ground.  Posterior knee.    Treatments tried to this point: NSAIDs  did some physical therapy.  Voltaren oral was great for a short time    Had a corticosteroid injection which helped a little bit but didn't last long.   MRI was done.     Orthopedic PMH: reports some ankle arthritis.    Patient Active Problem List   Diagnosis     Hypothyroidism     Hyperlipidemia LDL goal <130     Benign hypertension     Erectile dysfunction     Left knee pain      Other PMH:  has a past medical history of Arthritis (2019), Hypertension (2010), and Thyroid disease (1996).    He has no past medical history of Heart disease.    Surgical:  has a past surgical history that includes colonoscopy (2018) and hernia repair (2018).    Family Hx:  family history includes Cerebrovascular Disease in his mother; Hypertension in his mother.    Social Hx:  reports that he has never smoked. He has never been exposed to tobacco smoke. He has never used smokeless tobacco. He reports current alcohol use. He reports that he does not use drugs.    REVIEW OF SYSTEMS:    CONSTITUTIONAL:  NEGATIVE for fever, chills, change in weight  INTEGUMENTARY/SKIN:  NEGATIVE for worrisome rashes, moles or lesions  EYES:  NEGATIVE for vision changes or irritation  ENT/MOUTH:  NEGATIVE for ear, mouth  "and throat problems  RESP:  NEGATIVE for significant cough or SOB  BREAST:  NEGATIVE for masses, tenderness or discharge  CV:  NEGATIVE for chest pain, palpitations or peripheral edema  GI:  NEGATIVE for nausea, abdominal pain, heartburn, or change in bowel habits  :  Negative   MUSCULOSKELETAL:  See HPI above  NEURO:  NEGATIVE for weakness, dizziness or paresthesias  ENDOCRINE:  NEGATIVE for temperature intolerance, skin/hair changes  HEME/ALLERGY/IMMUNE:  NEGATIVE for bleeding problems  PSYCHIATRIC:  NEGATIVE for changes in mood or affect    PHYSICAL EXAM:  BP (!) 158/77 (BP Location: Left arm, Patient Position: Sitting, Cuff Size: Adult Regular)   Pulse 78   Ht 1.575 m (5' 2\")   Wt 66.2 kg (146 lb)   SpO2 98%   BMI 26.70 kg/m     GENERAL APPEARANCE: healthy, alert, and no distress   SKIN: no suspicious lesions or rashes  NEURO: Normal strength and tone, mentation intact, and speech normal  VASCULAR:  good pulses, and cappillary refill   LYMPH: no lymphadenopathy   PSYCH:  mentation appears normal and affect normal/bright  RESP: no increased work of breathing     KNEE EXAM:   Gait: walks with normal gait  Alignment: mild varus  Squat: 60 % painful.    Patellofemoral joint: mild crepitations in the patellofemoral joint.  Effusion: minimal   ROM: 0*-110*  Tender: medial joint line  Masses: none  Ligaments:  Lachman's Stable, Anterior and posterior drawer stable, stable to varus and valgus stress.  no pain with Varus/Valgus stress testing.    McMurrays: pain but no catching with hyperflexion    X-RAY:  XR KNEE LEFT 1/2 VIEWS 11/14/2022   1:   Mild-moderate medial joint space narrowing.   No acute fracture.  Chronic bone formation tibial tuberosity at the distal patellar tendon  insertion with adjacent soft tissue thickening. There is a joint  effusion.       MRI:  MR left knee without contrast 8/28/2023 7:48 AM    Third head of gastrocnemius, located lateral to the popliteus vessels.  Also separate muscle " belly superficial to the tibial nerve i.e. the  segment of nerve coursing between the third head and this muscle  belly.                                                                    Impression:  1. Markedly truncated medial meniscal body with intermediate signal  violating articular surface, may be due to prior partial menisectomy.  Please correlate with previous operative history. If not, finding  consistent with tear.  2. Lateral meniscus body tear with 10 mm meniscal flap in the lateral  femoral gutter.  3. Mucoid/cystic degeneration of the anterior crucial ligament.  4. 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  5. Moderate knee joint effusion.  6. Anatomic variant of accessory gastrocnemius muscle bellies.     Impression: lateral meniscus tear with displaced flap  Degenerative medial meniscus tear   Osteoarthritis left knee. Mild varus    Plan:  Knee Arthroscopy OA: Discussed findings with patient.  We talked about treatment options.  The pain may be due to either arthritis or the meniscal tear; or a combination of both.  I feel the best treatment option due to the mechanical symptoms is to do arthroscopy.  Injections can be done later after the knee has been debrided.  I have explained the nature of the procedure, the risks and recovery time with the patient.      AMY Mcmahon MD  Dept. Orthopedic Surgery  [facility]       Again, thank you for allowing me to participate in the care of your patient.        Sincerely,        Koby Mcmahon MD

## 2023-09-15 ENCOUNTER — HOSPITAL ENCOUNTER (OUTPATIENT)
Facility: AMBULATORY SURGERY CENTER | Age: 63
End: 2023-09-15
Attending: ORTHOPAEDIC SURGERY | Admitting: ORTHOPAEDIC SURGERY
Payer: COMMERCIAL

## 2023-09-15 PROBLEM — M23.304 DERANGEMENT OF MEDIAL MENISCUS OF LEFT KNEE: Status: ACTIVE | Noted: 2023-09-14

## 2023-09-15 PROBLEM — S83.282D OTHER TEAR OF LATERAL MENISCUS OF LEFT KNEE AS CURRENT INJURY, SUBSEQUENT ENCOUNTER: Status: ACTIVE | Noted: 2023-09-14

## 2023-09-15 NOTE — TELEPHONE ENCOUNTER
Surgery Scheduling left message for patient to call back to schedule surgery [phone]. Returning patients call

## 2023-09-15 NOTE — TELEPHONE ENCOUNTER
Type of surgery: LEFT KNEE ARTHROSCOPIC MEDIAL/LATERAL MENISECTOMY (Left)   Location of surgery: [facility]  Date and time of surgery: 10/06/2023  Surgeon: VOLODYMYR  Pre-Op Appt Date: 09/28/2023  Post-Op Appt Date: 10/19/2023   Packet sent out: Yes  Pre-cert/Authorization completed:  No  Date:

## 2023-09-28 NOTE — TELEPHONE ENCOUNTER
Surgery canceled for new Atrial flutter. Needs surgery with cardiology before he can proceed with Ortho surgery.

## 2023-10-17 ENCOUNTER — OFFICE VISIT (OUTPATIENT)
Dept: FAMILY MEDICINE | Facility: CLINIC | Age: 63
End: 2023-10-17
Payer: COMMERCIAL

## 2023-10-17 VITALS
RESPIRATION RATE: 20 BRPM | BODY MASS INDEX: 27.23 KG/M2 | TEMPERATURE: 97.5 F | HEART RATE: 52 BPM | SYSTOLIC BLOOD PRESSURE: 148 MMHG | HEIGHT: 62 IN | WEIGHT: 148 LBS | DIASTOLIC BLOOD PRESSURE: 73 MMHG | OXYGEN SATURATION: 96 %

## 2023-10-17 DIAGNOSIS — I10 BENIGN HYPERTENSION: Primary | ICD-10-CM

## 2023-10-17 DIAGNOSIS — E06.3 HYPOTHYROIDISM DUE TO HASHIMOTO'S THYROIDITIS: ICD-10-CM

## 2023-10-17 DIAGNOSIS — E78.5 HYPERLIPIDEMIA LDL GOAL <130: ICD-10-CM

## 2023-10-17 DIAGNOSIS — Z12.11 SCREEN FOR COLON CANCER: ICD-10-CM

## 2023-10-17 DIAGNOSIS — I48.92 ATRIAL FLUTTER, UNSPECIFIED TYPE (H): ICD-10-CM

## 2023-10-17 PROCEDURE — 80048 BASIC METABOLIC PNL TOTAL CA: CPT | Performed by: PHYSICIAN ASSISTANT

## 2023-10-17 PROCEDURE — 99214 OFFICE O/P EST MOD 30 MIN: CPT | Performed by: PHYSICIAN ASSISTANT

## 2023-10-17 PROCEDURE — 36415 COLL VENOUS BLD VENIPUNCTURE: CPT | Performed by: PHYSICIAN ASSISTANT

## 2023-10-17 RX ORDER — SIMVASTATIN 20 MG
20 TABLET ORAL AT BEDTIME
Qty: 90 TABLET | Refills: 1 | Status: SHIPPED | OUTPATIENT
Start: 2023-10-17 | End: 2024-04-08

## 2023-10-17 RX ORDER — LISINOPRIL AND HYDROCHLOROTHIAZIDE 20; 25 MG/1; MG/1
1 TABLET ORAL DAILY
Qty: 90 TABLET | Refills: 1 | Status: SHIPPED | OUTPATIENT
Start: 2023-10-17 | End: 2024-04-09

## 2023-10-17 RX ORDER — LEVOTHYROXINE SODIUM 100 UG/1
100 TABLET ORAL DAILY
Qty: 90 TABLET | Refills: 1 | Status: SHIPPED | OUTPATIENT
Start: 2023-10-17 | End: 2024-04-08

## 2023-10-17 RX ORDER — SOTALOL HYDROCHLORIDE 80 MG/1
80 TABLET ORAL 2 TIMES DAILY
COMMUNITY
Start: 2023-10-17

## 2023-10-17 RX ORDER — LISINOPRIL 10 MG/1
10 TABLET ORAL DAILY
Qty: 90 TABLET | Refills: 0 | Status: SHIPPED | OUTPATIENT
Start: 2023-10-17 | End: 2023-11-30

## 2023-10-17 ASSESSMENT — PAIN SCALES - GENERAL: PAINLEVEL: MILD PAIN (2)

## 2023-10-17 NOTE — PROGRESS NOTES
"    Delmy   Johan is a 63 year old, presenting for the following health issues:  Hypertension, Hyperlipidemia, Thyroid Problem, and Health Maintenance (Patient is fasting, patient declines vaccines today)        10/17/2023    10:30 AM   Additional Questions   Roomed by Saundra Melgoza CMA   Accompanied by None         10/17/2023    10:30 AM   Patient Reported Additional Medications   Patient reports taking the following new medications none       History of Present Illness       Hyperlipidemia:  He presents for follow up of hyperlipidemia.   He is taking medication to lower cholesterol. He is not having myalgia or other side effects to statin medications.    Hypertension: He presents for follow up of hypertension.  He does check blood pressure  regularly outside of the clinic. Outside blood pressures have been over 140/90. He follows a low salt diet.     Hypothyroidism:     Since last visit, patient describes the following symptoms::  None    He eats 0-1 servings of fruits and vegetables daily.He consumes 0 sweetened beverage(s) daily.He exercises with enough effort to increase his heart rate 9 or less minutes per day.  He exercises with enough effort to increase his heart rate 3 or less days per week.   He is taking medications regularly.     Recent history of an atrial flutter. 5 s/p ablation procedure.  Denies chest pain/sob/palpitations.  No dizziness.   Recent tsh was within therapeutic range.        Review of Systems   Constitutional, HEENT, cardiovascular, pulmonary, GI, , musculoskeletal, neuro, skin, endocrine and psych systems are negative, except as otherwise noted.      Objective    BP (!) 148/73   Pulse 52   Temp 97.5  F (36.4  C) (Temporal)   Resp 20   Ht 1.575 m (5' 2\")   Wt 67.1 kg (148 lb)   SpO2 96%   BMI 27.07 kg/m    Body mass index is 27.07 kg/m .  Physical Exam   Eye exam - right eye normal lid, conjunctiva, cornea, pupil and fundus, left eye normal lid, conjunctiva, cornea, pupil " and fundus.  Thyroid not palpable, not enlarged, no nodules detected.  CHEST:chest clear to IPPA, no tachypnea, retractions or cyanosis, and S1, S2 normal, no murmur, no gallop, rate regular.  Pulses normal     Johan was seen today for hypertension, hyperlipidemia, thyroid problem and health maintenance.    Diagnoses and all orders for this visit:    Benign hypertension  -     BASIC METABOLIC PANEL; Future  -     lisinopril (ZESTRIL) 10 MG tablet; Take 1 tablet (10 mg) by mouth daily  -     lisinopril-hydrochlorothiazide (ZESTORETIC) 20-25 MG tablet; Take 1 tablet by mouth daily    Screen for colon cancer  -     Fecal colorectal cancer screen FIT - Future (S+30); Future    Atrial flutter, unspecified type (H)    Hypothyroidism due to Hashimoto's thyroiditis  -     levothyroxine (SYNTHROID/LEVOTHROID) 100 MCG tablet; Take 1 tablet (100 mcg) by mouth daily    Hyperlipidemia LDL goal <130  -     simvastatin (ZOCOR) 20 MG tablet; Take 1 tablet (20 mg) by mouth at bedtime    Other orders  -     REVIEW OF HEALTH MAINTENANCE PROTOCOL ORDERS      Low intensity exercise  work on lifestyle modification  Recheck in 6-8 wks.   Continue all other meds.

## 2023-10-18 ENCOUNTER — PREP FOR PROCEDURE (OUTPATIENT)
Dept: ORTHOPEDICS | Facility: CLINIC | Age: 63
End: 2023-10-18
Payer: COMMERCIAL

## 2023-10-18 DIAGNOSIS — M94.262 CHONDROMALACIA OF LEFT KNEE: ICD-10-CM

## 2023-10-18 DIAGNOSIS — S83.282D TEAR OF LATERAL MENISCUS OF LEFT KNEE, CURRENT, UNSPECIFIED TEAR TYPE, SUBSEQUENT ENCOUNTER: ICD-10-CM

## 2023-10-18 DIAGNOSIS — M23.204 OLD TEAR OF MEDIAL MENISCUS OF LEFT KNEE, UNSPECIFIED TEAR TYPE: Primary | ICD-10-CM

## 2023-10-18 LAB
ANION GAP SERPL CALCULATED.3IONS-SCNC: 11 MMOL/L (ref 7–15)
BUN SERPL-MCNC: 15.6 MG/DL (ref 8–23)
CALCIUM SERPL-MCNC: 9.3 MG/DL (ref 8.8–10.2)
CHLORIDE SERPL-SCNC: 96 MMOL/L (ref 98–107)
CREAT SERPL-MCNC: 0.9 MG/DL (ref 0.67–1.17)
DEPRECATED HCO3 PLAS-SCNC: 26 MMOL/L (ref 22–29)
EGFRCR SERPLBLD CKD-EPI 2021: >90 ML/MIN/1.73M2
GLUCOSE SERPL-MCNC: 100 MG/DL (ref 70–99)
POTASSIUM SERPL-SCNC: 4.2 MMOL/L (ref 3.4–5.3)
SODIUM SERPL-SCNC: 133 MMOL/L (ref 135–145)

## 2023-10-19 ENCOUNTER — TELEPHONE (OUTPATIENT)
Dept: ORTHOPEDICS | Facility: CLINIC | Age: 63
End: 2023-10-19

## 2023-10-25 PROBLEM — M94.262 CHONDROMALACIA OF LEFT KNEE: Status: ACTIVE | Noted: 2023-10-18

## 2023-10-25 PROBLEM — M23.204 OLD TEAR OF MEDIAL MENISCUS OF LEFT KNEE, UNSPECIFIED TEAR TYPE: Status: ACTIVE | Noted: 2023-10-18

## 2023-10-25 PROBLEM — S83.282D TEAR OF LATERAL MENISCUS OF LEFT KNEE, CURRENT, UNSPECIFIED TEAR TYPE, SUBSEQUENT ENCOUNTER: Status: ACTIVE | Noted: 2023-10-18

## 2023-11-11 ENCOUNTER — OFFICE VISIT (OUTPATIENT)
Dept: URGENT CARE | Facility: URGENT CARE | Age: 63
End: 2023-11-11
Payer: COMMERCIAL

## 2023-11-11 ENCOUNTER — ANCILLARY PROCEDURE (OUTPATIENT)
Dept: GENERAL RADIOLOGY | Facility: CLINIC | Age: 63
End: 2023-11-11
Attending: PHYSICIAN ASSISTANT
Payer: COMMERCIAL

## 2023-11-11 VITALS
DIASTOLIC BLOOD PRESSURE: 66 MMHG | SYSTOLIC BLOOD PRESSURE: 157 MMHG | WEIGHT: 144.5 LBS | OXYGEN SATURATION: 98 % | HEART RATE: 54 BPM | TEMPERATURE: 100.9 F | RESPIRATION RATE: 24 BRPM | BODY MASS INDEX: 26.43 KG/M2

## 2023-11-11 DIAGNOSIS — R05.1 ACUTE COUGH: ICD-10-CM

## 2023-11-11 DIAGNOSIS — R59.9 ENLARGED LYMPH NODES: ICD-10-CM

## 2023-11-11 DIAGNOSIS — J22 LOWER RESPIRATORY INFECTION: Primary | ICD-10-CM

## 2023-11-11 LAB
ERYTHROCYTE [DISTWIDTH] IN BLOOD BY AUTOMATED COUNT: 11.6 % (ref 10–15)
HCT VFR BLD AUTO: 40.6 % (ref 40–53)
HGB BLD-MCNC: 14.2 G/DL (ref 13.3–17.7)
MCH RBC QN AUTO: 31.6 PG (ref 26.5–33)
MCHC RBC AUTO-ENTMCNC: 35 G/DL (ref 31.5–36.5)
MCV RBC AUTO: 90 FL (ref 78–100)
MONOCYTES NFR BLD AUTO: NEGATIVE %
PLATELET # BLD AUTO: 155 10E3/UL (ref 150–450)
RBC # BLD AUTO: 4.5 10E6/UL (ref 4.4–5.9)
WBC # BLD AUTO: 5.3 10E3/UL (ref 4–11)

## 2023-11-11 PROCEDURE — 86308 HETEROPHILE ANTIBODY SCREEN: CPT | Performed by: PHYSICIAN ASSISTANT

## 2023-11-11 PROCEDURE — 85027 COMPLETE CBC AUTOMATED: CPT | Performed by: PHYSICIAN ASSISTANT

## 2023-11-11 PROCEDURE — 99214 OFFICE O/P EST MOD 30 MIN: CPT | Performed by: PHYSICIAN ASSISTANT

## 2023-11-11 PROCEDURE — 71046 X-RAY EXAM CHEST 2 VIEWS: CPT | Mod: TC | Performed by: RADIOLOGY

## 2023-11-11 PROCEDURE — 36415 COLL VENOUS BLD VENIPUNCTURE: CPT | Performed by: PHYSICIAN ASSISTANT

## 2023-11-11 RX ORDER — CEFDINIR 300 MG/1
300 CAPSULE ORAL 2 TIMES DAILY
Qty: 14 CAPSULE | Refills: 0 | Status: SHIPPED | OUTPATIENT
Start: 2023-11-11 | End: 2023-11-18

## 2023-11-11 RX ORDER — ALBUTEROL SULFATE 90 UG/1
2 AEROSOL, METERED RESPIRATORY (INHALATION) EVERY 4 HOURS PRN
Qty: 18 G | Refills: 0 | Status: SHIPPED | OUTPATIENT
Start: 2023-11-11 | End: 2024-09-18

## 2023-11-11 RX ORDER — DOXYCYCLINE 100 MG/1
100 CAPSULE ORAL 2 TIMES DAILY
Qty: 14 CAPSULE | Refills: 0 | Status: SHIPPED | OUTPATIENT
Start: 2023-11-11 | End: 2023-11-18

## 2023-11-11 NOTE — PATIENT INSTRUCTIONS
Omnicef and Doxycycline each twice daily for 7 days  Albuterol inhaler, 2 puffs up to every 4 hours as needed

## 2023-11-11 NOTE — PROGRESS NOTES
Assessment & Plan     Lower respiratory infection  - cefdinir (OMNICEF) 300 MG capsule; Take 1 capsule (300 mg) by mouth 2 times daily for 7 days  - doxycycline hyclate (VIBRAMYCIN) 100 MG capsule; Take 1 capsule (100 mg) by mouth 2 times daily for 7 days  - albuterol (PROAIR HFA/PROVENTIL HFA/VENTOLIN HFA) 108 (90 Base) MCG/ACT inhaler; Inhale 2 puffs into the lungs every 4 hours as needed for shortness of breath or wheezing    Acute cough  - CBC with platelets; Future  - XR Chest 2 Views; Future  - Mononucleosis screen; Future  - CBC with platelets  - Mononucleosis screen    Enlarged lymph nodes  - CBC with platelets; Future  - XR Chest 2 Views; Future  - Mononucleosis screen; Future  - CBC with platelets  - Mononucleosis screen    CXR with right sided patchy infiltrates by my read, awaiting read by radiology. Opted to treat with antibiotic given persistent fever and  cough. CBC normal and mono negative.     Return in about 1 week (around 11/18/2023) for visit with primary care provider if not improving, sooner if worsening.     AG[facility]    Subjective   Esau Avendano is a 63 year old who presents for the following health issues     Patient presents with:  Urgent Care: Urgent care visit for fever, chills, sweats, and cough.   Fever: Fever and chills since last Sunday. He checked his temperature on Monday and it was. He has had a fever off and on since. The highest was 101 this past Tuesday/Wednesday. No sore throat. No diarrhea. No nausea/vomiting. He just doesn't feel like eating.  Cough: Dry cough for two weeks that has gotten slightly worse. No shortness of breath. No history of frequent pneumonia.  Sweats: Sweats off and on since this past Sunday. He woke up at night and was soaked a few nights to the point where he had to change.    PRAVIN    Johan presents clinic today for evaluation of a cough.  Symptoms first began 7 days ago and felt like they were coming  from deep in his chest.  He has had an intermittent fever up to 101, every day since the cough began.  He felt like he was getting a little bit better and went to work on Thursday but then felt much worse on Friday.  This morning he states that he woke up feeling okay, made a large breakfast and then fell asleep afterward.  He woke up and felt worse and decided to come in.  He did take a dose of Tylenol earlier this morning but this is not currently in his system.      Review of Systems   ROS negative except as stated above.      Objective    BP (!) 157/66 (BP Location: Right arm, Patient Position: Sitting, Cuff Size: Adult Regular)   Pulse 54   Temp (!) 100.9  F (38.3  C) (Tympanic)   Resp 24   Wt 65.5 kg (144 lb 8 oz)   SpO2 98%   BMI 26.43 kg/m    Physical Exam   GENERAL: healthy, alert and no distress  EYES: Eyes grossly normal to inspection, PERRL and conjunctivae and sclerae normal  HENT: ear canals and TM's normal, nose and mouth without ulcers or lesions  NECK: bilateral anterior cervical adenopathy, no asymmetry, masses, or scars and thyroid normal to palpation  RESP: lungs clear to auscultation - no rales, rhonchi or wheezes, no increased respiratory effort  CV: regular rate and rhythm, normal S1 S2, no S3 or S4, no murmur, click or rub, no peripheral edema and peripheral pulses strong    Results for orders placed or performed in visit on 11/11/23   CBC with platelets     Status: Normal   Result Value Ref Range    WBC Count 5.3 4.0 - 11.0 10e3/uL    RBC Count 4.50 4.40 - 5.90 10e6/uL    Hemoglobin 14.2 13.3 - 17.7 g/dL    Hematocrit 40.6 40.0 - 53.0 %    MCV 90 78 - 100 fL    MCH 31.6 26.5 - 33.0 pg    MCHC 35.0 31.5 - 36.5 g/dL    RDW 11.6 10.0 - 15.0 %    Platelet Count 155 150 - 450 10e3/uL   Mononucleosis screen     Status: Normal   Result Value Ref Range    Mononucleosis Screen Negative Negative

## 2023-11-30 ENCOUNTER — OFFICE VISIT (OUTPATIENT)
Dept: FAMILY MEDICINE | Facility: CLINIC | Age: 63
End: 2023-11-30
Payer: COMMERCIAL

## 2023-11-30 VITALS
DIASTOLIC BLOOD PRESSURE: 78 MMHG | OXYGEN SATURATION: 99 % | SYSTOLIC BLOOD PRESSURE: 154 MMHG | WEIGHT: 145.6 LBS | BODY MASS INDEX: 26.79 KG/M2 | TEMPERATURE: 97.7 F | HEART RATE: 50 BPM | HEIGHT: 62 IN | RESPIRATION RATE: 20 BRPM

## 2023-11-30 DIAGNOSIS — I10 BENIGN HYPERTENSION: ICD-10-CM

## 2023-11-30 DIAGNOSIS — S83.207A TEAR OF MENISCUS OF LEFT KNEE AS CURRENT INJURY, UNSPECIFIED MENISCUS, UNSPECIFIED TEAR TYPE, INITIAL ENCOUNTER: ICD-10-CM

## 2023-11-30 DIAGNOSIS — R97.20 ELEVATED PROSTATE SPECIFIC ANTIGEN (PSA): ICD-10-CM

## 2023-11-30 DIAGNOSIS — Z01.818 PREOP GENERAL PHYSICAL EXAM: Primary | ICD-10-CM

## 2023-11-30 LAB
ANION GAP SERPL CALCULATED.3IONS-SCNC: 11 MMOL/L (ref 7–15)
BUN SERPL-MCNC: 16.6 MG/DL (ref 8–23)
CALCIUM SERPL-MCNC: 9.7 MG/DL (ref 8.8–10.2)
CHLORIDE SERPL-SCNC: 97 MMOL/L (ref 98–107)
CREAT SERPL-MCNC: 0.85 MG/DL (ref 0.67–1.17)
DEPRECATED HCO3 PLAS-SCNC: 28 MMOL/L (ref 22–29)
EGFRCR SERPLBLD CKD-EPI 2021: >90 ML/MIN/1.73M2
GLUCOSE SERPL-MCNC: 100 MG/DL (ref 70–99)
POTASSIUM SERPL-SCNC: 4.2 MMOL/L (ref 3.4–5.3)
PSA SERPL DL<=0.01 NG/ML-MCNC: 9.59 NG/ML (ref 0–4.5)
SODIUM SERPL-SCNC: 136 MMOL/L (ref 135–145)

## 2023-11-30 PROCEDURE — 93000 ELECTROCARDIOGRAM COMPLETE: CPT | Performed by: PHYSICIAN ASSISTANT

## 2023-11-30 PROCEDURE — 36415 COLL VENOUS BLD VENIPUNCTURE: CPT | Performed by: PHYSICIAN ASSISTANT

## 2023-11-30 PROCEDURE — 99214 OFFICE O/P EST MOD 30 MIN: CPT | Mod: 25 | Performed by: PHYSICIAN ASSISTANT

## 2023-11-30 PROCEDURE — 80048 BASIC METABOLIC PNL TOTAL CA: CPT | Performed by: PHYSICIAN ASSISTANT

## 2023-11-30 PROCEDURE — 84153 ASSAY OF PSA TOTAL: CPT | Performed by: PHYSICIAN ASSISTANT

## 2023-11-30 RX ORDER — FELODIPINE 5 MG/1
5 TABLET, EXTENDED RELEASE ORAL DAILY
Qty: 90 TABLET | Refills: 0 | Status: SHIPPED | OUTPATIENT
Start: 2023-11-30 | End: 2024-04-02

## 2023-11-30 ASSESSMENT — PAIN SCALES - GENERAL: PAINLEVEL: MILD PAIN (2)

## 2023-11-30 NOTE — H&P (VIEW-ONLY)
[facility]  [address]  [place] [address]  Phone: [phone]  Primary Provider: Antonio Rico  Pre-op Performing Provider: ANTONIO RICO      PREOPERATIVE EVALUATION:  Today's date: 11/30/2023    Johan is a 63 year old, presenting for the following:  Pre-Op Exam (Left knee repair/Dr. Mcmahon/12/6/23/[facility]) and Health Maintenance (Patient declined vaccines today)        11/30/2023     9:10 AM   Additional Questions   Roomed by Saundra Melgoza CMA   Accompanied by None         11/30/2023     9:10 AM   Patient Reported Additional Medications   Patient reports taking the following new medications none       Surgical Information:  Surgery/Procedure: Left knee repair  [facility]  Surgeon: Dr. Mcmahon  Surgery Date: 12/6/23  Time of Surgery: AM  Where patient plans to recover: At home with family  Fax number for surgical facility: Note does not need to be faxed, will be available electronically in Epic.    Johan was seen today for pre-op exam and health maintenance.    Diagnoses and all orders for this visit:    Preop general physical exam    Benign hypertension  -     BASIC METABOLIC PANEL; Future  -     EKG 12-lead complete w/read - Clinics  -     felodipine ER (PLENDIL) 5 MG 24 hr tablet; Take 1 tablet (5 mg) by mouth daily  Cardiology had him stop his 10 mg lisinopril and instead had him take 2 lisinopril-hydrochlorothiazide 20-25 mg  instead. Minimal if any impact on his blood pressure and I;m concerned this dosage of hydrochlorothiazide will continue to effect his mild history with hyponatremia. Will have him return to taking 1 tab of lisinopril-hydrochlorothiazide daily and will add in felodipine 5 mg daily.  Tear of meniscus of left knee as current injury, unspecified meniscus, unspecified tear type, initial encounter    Hold eliquis 72 hrs prior to surgery  Take sotolol and felodipine on the morning of surgery. Hold other meds and be fasting      Johan is cleared for surgery and appropriate anesthesia.    Subjective       HPI related to upcoming procedure: Left knee repair        11/30/2023     8:37 AM   Preop Questions   1. Have you ever had a heart attack or stroke? No   2. Have you ever had surgery on your heart or blood vessels, such as a stent placement, a coronary artery bypass, or surgery on an artery in your head, neck, heart, or legs? No   3. Do you have chest pain with activity? No   4. Do you have a history of  heart failure? No   5. Do you currently have a cold, bronchitis or symptoms of other infection? No   6. Do you have a cough, shortness of breath, or wheezing? No   7. Do you or anyone in your family have previous history of blood clots? No   8. Do you or does anyone in your family have a serious bleeding problem such as prolonged bleeding following surgeries or cuts? No   9. Have you ever had problems with anemia or been told to take iron pills? No   10. Have you had any abnormal blood loss such as black, tarry or bloody stools? No   11. Have you ever had a blood transfusion? No   12. Are you willing to have a blood transfusion if it is medically needed before, during, or after your surgery? Yes   13. Have you or any of your relatives ever had problems with anesthesia? No   14. Do you have sleep apnea, excessive snoring or daytime drowsiness? No   15. Do you have any artifical heart valves or other implanted medical devices like a pacemaker, defibrillator, or continuous glucose monitor? No   16. Do you have artificial joints? No   17. Are you allergic to latex? No       Health Care Directive:  Patient does not have a Health Care Directive or Living Will: Discussed advance care planning with patient; however, patient declined at this time.    Preoperative Review of :   reviewed - no record of controlled substances prescribed.      Status of Chronic Conditions:  See problem list for active medical problems.  Problems all longstanding  and stable, except as noted/documented.  See ROS for pertinent symptoms related to these conditions.    Review of Systems  CONSTITUTIONAL: NEGATIVE for fever, chills, change in weight  INTEGUMENTARY/SKIN: NEGATIVE for worrisome rashes, moles or lesions  EYES: NEGATIVE for vision changes or irritation  ENT/MOUTH: NEGATIVE for ear, mouth and throat problems  RESP: NEGATIVE for significant cough or SOB  CV: NEGATIVE for chest pain, palpitations or peripheral edema  GI: NEGATIVE for nausea, abdominal pain, heartburn, or change in bowel habits  : NEGATIVE for frequency, dysuria, or hematuria  MUSCULOSKELETAL: NEGATIVE for significant arthralgias or myalgia  NEURO: NEGATIVE for weakness, dizziness or paresthesias  ENDOCRINE: NEGATIVE for temperature intolerance, skin/hair changes  HEME: NEGATIVE for bleeding problems  PSYCHIATRIC: NEGATIVE for changes in mood or affect    Patient Active Problem List    Diagnosis Date Noted    Old tear of medial meniscus of left knee, unspecified tear type 10/18/2023     Priority: Medium    Tear of lateral meniscus of left knee, current, unspecified tear type, subsequent encounter 10/18/2023     Priority: Medium    Chondromalacia of left knee 10/18/2023     Priority: Medium    Atrial flutter, unspecified type (H) 10/17/2023     Priority: Medium    Derangement of medial meniscus of left knee 09/14/2023     Priority: Medium    Other tear of lateral meniscus of left knee as current injury, subsequent encounter 09/14/2023     Priority: Medium    Left knee pain 01/11/2023     Priority: Medium    Hypothyroidism 02/24/2012     Priority: Medium     Labs placed and requested update.       Hyperlipidemia LDL goal <130 02/24/2012     Priority: Medium     LDL      101   5/4/2012         Benign hypertension 02/24/2012     Priority: Medium     BP Readings from Last 1 Encounters:   02/24/12 174/84   Labs placed and requested update.         Erectile dysfunction 02/24/2012     Priority: Medium     Some  "ED.  May be medications too. Check testosterone.  Discussed Levitra.         Past Medical History:   Diagnosis Date    Arthritis 2019    Atrial flutter, unspecified type (H) 10/17/2023    Hypertension 2010    Longtime ago    Thyroid disease 1996     Past Surgical History:   Procedure Laterality Date    COLONOSCOPY  2018    HERNIA REPAIR  2018     Current Outpatient Medications   Medication Sig Dispense Refill    albuterol (PROAIR HFA/PROVENTIL HFA/VENTOLIN HFA) 108 (90 Base) MCG/ACT inhaler Inhale 2 puffs into the lungs every 4 hours as needed for shortness of breath or wheezing 18 g 0    apixaban ANTICOAGULANT (ELIQUIS) 5 MG tablet Take 5 mg by mouth      felodipine ER (PLENDIL) 5 MG 24 hr tablet Take 1 tablet (5 mg) by mouth daily 90 tablet 0    levothyroxine (SYNTHROID/LEVOTHROID) 100 MCG tablet Take 1 tablet (100 mcg) by mouth daily 90 tablet 1    lisinopril-hydrochlorothiazide (ZESTORETIC) 20-25 MG tablet Take 1 tablet by mouth daily 90 tablet 1    simvastatin (ZOCOR) 20 MG tablet Take 1 tablet (20 mg) by mouth at bedtime 90 tablet 1    sotalol (BETAPACE) 80 MG tablet Take 1 tablet (80 mg) by mouth 2 times daily         Allergies   Allergen Reactions    Amoxicillin-Pot Clavulanate Nausea and Vomiting        Social History     Tobacco Use    Smoking status: Never     Passive exposure: Past (Dad smoked in the home while he was growing up.)    Smokeless tobacco: Never   Substance Use Topics    Alcohol use: Yes     Comment: weekends     Family History   Problem Relation Age of Onset    Cerebrovascular Disease Mother     Hypertension Mother      History   Drug Use No         Objective     BP (!) 154/78   Pulse 50   Temp 97.7  F (36.5  C) (Temporal)   Resp 20   Ht 1.568 m (5' 1.75\")   Wt 66 kg (145 lb 9.6 oz)   SpO2 99%   BMI 26.85 kg/m      Physical Exam    GENERAL APPEARANCE: healthy, alert and no distress     EYES: EOMI,  PERRL     HENT: ear canals and TM's normal and nose and mouth without ulcers or " lesions     NECK: no adenopathy, no asymmetry, masses, or scars and thyroid normal to palpation     RESP: lungs clear to auscultation - no rales, rhonchi or wheezes     CV: regular rates and rhythm, normal S1 S2, no S3 or S4 and no murmur, click or rub     ABDOMEN:  soft, nontender, no HSM or masses and bowel sounds normal     MS: extremities normal- no gross deformities noted, no evidence of inflammation in joints, FROM in all extremities.     SKIN: no suspicious lesions or rashes     NEURO: Normal strength and tone, sensory exam grossly normal, mentation intact and speech normal     PSYCH: mentation appears normal. and affect normal/bright     LYMPHATICS: No cervical adenopathy    Recent Labs   Lab Test 11/11/23  1534 10/17/23  1131 04/18/23  0757   HGB 14.2  --   --      --   --    NA  --  133* 132*   POTASSIUM  --  4.2 3.9   CR  --  0.90 0.86        Diagnostics:  Labs pending at this time.  Results will be reviewed when available.   EKG: appears normal, NSR, sinus bradycardia, normal axis, normal intervals, no acute ST/T changes c/w ischemia, no LVH by voltage criteria, unchanged from previous tracings    Revised Cardiac Risk Index (RCRI):  The patient has the following serious cardiovascular risks for perioperative complications:   - No serious cardiac risks = 0 points     RCRI Interpretation: 0 points: Class I (very low risk - 0.4% complication rate)         Signed Electronically by: Toni Carrillo PA-C  Copy of this evaluation report is provided to requesting physician.

## 2023-11-30 NOTE — PROGRESS NOTES
[facility]  [address]  [place] [address]  Phone: [phone]  Primary Provider: Antonio Rico  Pre-op Performing Provider: ANTONIO RICO      PREOPERATIVE EVALUATION:  Today's date: 11/30/2023    Johan is a 63 year old, presenting for the following:  Pre-Op Exam (Left knee repair/Dr. Mcmahon/12/6/23/[place] [facility]) and Health Maintenance (Patient declined vaccines today)        11/30/2023     9:10 AM   Additional Questions   Roomed by Saundra Melgoza CMA   Accompanied by None         11/30/2023     9:10 AM   Patient Reported Additional Medications   Patient reports taking the following new medications none       Surgical Information:  Surgery/Procedure: Left knee repair  [facility]  Surgeon: Dr. Mcmahon  Surgery Date: 12/6/23  Time of Surgery: AM  Where patient plans to recover: At home with family  Fax number for surgical facility: Note does not need to be faxed, will be available electronically in Epic.    Johan was seen today for pre-op exam and health maintenance.    Diagnoses and all orders for this visit:    Preop general physical exam    Benign hypertension  -     BASIC METABOLIC PANEL; Future  -     EKG 12-lead complete w/read - Clinics  -     felodipine ER (PLENDIL) 5 MG 24 hr tablet; Take 1 tablet (5 mg) by mouth daily  Cardiology had him stop his 10 mg lisinopril and instead had him take 2 lisinopril-hydrochlorothiazide 20-25 mg  instead. Minimal if any impact on his blood pressure and I;m concerned this dosage of hydrochlorothiazide will continue to effect his mild history with hyponatremia. Will have him return to taking 1 tab of lisinopril-hydrochlorothiazide daily and will add in felodipine 5 mg daily.  Tear of meniscus of left knee as current injury, unspecified meniscus, unspecified tear type, initial encounter    Hold eliquis 72 hrs prior to surgery  Take sotolol and felodipine on the morning of surgery. Hold other meds and be fasting      Johan is cleared for surgery and appropriate anesthesia.    Subjective       HPI related to upcoming procedure: Left knee repair        11/30/2023     8:37 AM   Preop Questions   1. Have you ever had a heart attack or stroke? No   2. Have you ever had surgery on your heart or blood vessels, such as a stent placement, a coronary artery bypass, or surgery on an artery in your head, neck, heart, or legs? No   3. Do you have chest pain with activity? No   4. Do you have a history of  heart failure? No   5. Do you currently have a cold, bronchitis or symptoms of other infection? No   6. Do you have a cough, shortness of breath, or wheezing? No   7. Do you or anyone in your family have previous history of blood clots? No   8. Do you or does anyone in your family have a serious bleeding problem such as prolonged bleeding following surgeries or cuts? No   9. Have you ever had problems with anemia or been told to take iron pills? No   10. Have you had any abnormal blood loss such as black, tarry or bloody stools? No   11. Have you ever had a blood transfusion? No   12. Are you willing to have a blood transfusion if it is medically needed before, during, or after your surgery? Yes   13. Have you or any of your relatives ever had problems with anesthesia? No   14. Do you have sleep apnea, excessive snoring or daytime drowsiness? No   15. Do you have any artifical heart valves or other implanted medical devices like a pacemaker, defibrillator, or continuous glucose monitor? No   16. Do you have artificial joints? No   17. Are you allergic to latex? No       Health Care Directive:  Patient does not have a Health Care Directive or Living Will: Discussed advance care planning with patient; however, patient declined at this time.    Preoperative Review of :   reviewed - no record of controlled substances prescribed.      Status of Chronic Conditions:  See problem list for active medical problems.  Problems all longstanding  and stable, except as noted/documented.  See ROS for pertinent symptoms related to these conditions.    Review of Systems  CONSTITUTIONAL: NEGATIVE for fever, chills, change in weight  INTEGUMENTARY/SKIN: NEGATIVE for worrisome rashes, moles or lesions  EYES: NEGATIVE for vision changes or irritation  ENT/MOUTH: NEGATIVE for ear, mouth and throat problems  RESP: NEGATIVE for significant cough or SOB  CV: NEGATIVE for chest pain, palpitations or peripheral edema  GI: NEGATIVE for nausea, abdominal pain, heartburn, or change in bowel habits  : NEGATIVE for frequency, dysuria, or hematuria  MUSCULOSKELETAL: NEGATIVE for significant arthralgias or myalgia  NEURO: NEGATIVE for weakness, dizziness or paresthesias  ENDOCRINE: NEGATIVE for temperature intolerance, skin/hair changes  HEME: NEGATIVE for bleeding problems  PSYCHIATRIC: NEGATIVE for changes in mood or affect    Patient Active Problem List    Diagnosis Date Noted    Old tear of medial meniscus of left knee, unspecified tear type 10/18/2023     Priority: Medium    Tear of lateral meniscus of left knee, current, unspecified tear type, subsequent encounter 10/18/2023     Priority: Medium    Chondromalacia of left knee 10/18/2023     Priority: Medium    Atrial flutter, unspecified type (H) 10/17/2023     Priority: Medium    Derangement of medial meniscus of left knee 09/14/2023     Priority: Medium    Other tear of lateral meniscus of left knee as current injury, subsequent encounter 09/14/2023     Priority: Medium    Left knee pain 01/11/2023     Priority: Medium    Hypothyroidism 02/24/2012     Priority: Medium     Labs placed and requested update.       Hyperlipidemia LDL goal <130 02/24/2012     Priority: Medium     LDL      101   5/4/2012         Benign hypertension 02/24/2012     Priority: Medium     BP Readings from Last 1 Encounters:   02/24/12 174/84   Labs placed and requested update.         Erectile dysfunction 02/24/2012     Priority: Medium     Some  "ED.  May be medications too. Check testosterone.  Discussed Levitra.         Past Medical History:   Diagnosis Date    Arthritis 2019    Atrial flutter, unspecified type (H) 10/17/2023    Hypertension 2010    Longtime ago    Thyroid disease 1996     Past Surgical History:   Procedure Laterality Date    COLONOSCOPY  2018    HERNIA REPAIR  2018     Current Outpatient Medications   Medication Sig Dispense Refill    albuterol (PROAIR HFA/PROVENTIL HFA/VENTOLIN HFA) 108 (90 Base) MCG/ACT inhaler Inhale 2 puffs into the lungs every 4 hours as needed for shortness of breath or wheezing 18 g 0    apixaban ANTICOAGULANT (ELIQUIS) 5 MG tablet Take 5 mg by mouth      felodipine ER (PLENDIL) 5 MG 24 hr tablet Take 1 tablet (5 mg) by mouth daily 90 tablet 0    levothyroxine (SYNTHROID/LEVOTHROID) 100 MCG tablet Take 1 tablet (100 mcg) by mouth daily 90 tablet 1    lisinopril-hydrochlorothiazide (ZESTORETIC) 20-25 MG tablet Take 1 tablet by mouth daily 90 tablet 1    simvastatin (ZOCOR) 20 MG tablet Take 1 tablet (20 mg) by mouth at bedtime 90 tablet 1    sotalol (BETAPACE) 80 MG tablet Take 1 tablet (80 mg) by mouth 2 times daily         Allergies   Allergen Reactions    Amoxicillin-Pot Clavulanate Nausea and Vomiting        Social History     Tobacco Use    Smoking status: Never     Passive exposure: Past (Dad smoked in the home while he was growing up.)    Smokeless tobacco: Never   Substance Use Topics    Alcohol use: Yes     Comment: weekends     Family History   Problem Relation Age of Onset    Cerebrovascular Disease Mother     Hypertension Mother      History   Drug Use No         Objective     BP (!) 154/78   Pulse 50   Temp 97.7  F (36.5  C) (Temporal)   Resp 20   Ht 1.568 m (5' 1.75\")   Wt 66 kg (145 lb 9.6 oz)   SpO2 99%   BMI 26.85 kg/m      Physical Exam    GENERAL APPEARANCE: healthy, alert and no distress     EYES: EOMI,  PERRL     HENT: ear canals and TM's normal and nose and mouth without ulcers or " lesions     NECK: no adenopathy, no asymmetry, masses, or scars and thyroid normal to palpation     RESP: lungs clear to auscultation - no rales, rhonchi or wheezes     CV: regular rates and rhythm, normal S1 S2, no S3 or S4 and no murmur, click or rub     ABDOMEN:  soft, nontender, no HSM or masses and bowel sounds normal     MS: extremities normal- no gross deformities noted, no evidence of inflammation in joints, FROM in all extremities.     SKIN: no suspicious lesions or rashes     NEURO: Normal strength and tone, sensory exam grossly normal, mentation intact and speech normal     PSYCH: mentation appears normal. and affect normal/bright     LYMPHATICS: No cervical adenopathy    Recent Labs   Lab Test 11/11/23  1534 10/17/23  1131 04/18/23  0757   HGB 14.2  --   --      --   --    NA  --  133* 132*   POTASSIUM  --  4.2 3.9   CR  --  0.90 0.86        Diagnostics:  Labs pending at this time.  Results will be reviewed when available.   EKG: appears normal, NSR, sinus bradycardia, normal axis, normal intervals, no acute ST/T changes c/w ischemia, no LVH by voltage criteria, unchanged from previous tracings    Revised Cardiac Risk Index (RCRI):  The patient has the following serious cardiovascular risks for perioperative complications:   - No serious cardiac risks = 0 points     RCRI Interpretation: 0 points: Class I (very low risk - 0.4% complication rate)         Signed Electronically by: Toni Carrillo PA-C  Copy of this evaluation report is provided to requesting physician.

## 2023-12-05 ENCOUNTER — ANESTHESIA EVENT (OUTPATIENT)
Dept: SURGERY | Facility: AMBULATORY SURGERY CENTER | Age: 63
End: 2023-12-05
Payer: COMMERCIAL

## 2023-12-06 ENCOUNTER — HOSPITAL ENCOUNTER (OUTPATIENT)
Facility: AMBULATORY SURGERY CENTER | Age: 63
Discharge: HOME | End: 2023-12-06
Attending: ORTHOPAEDIC SURGERY | Admitting: ORTHOPAEDIC SURGERY
Payer: COMMERCIAL

## 2023-12-06 ENCOUNTER — TELEPHONE (OUTPATIENT)
Dept: ORTHOPEDICS | Facility: CLINIC | Age: 63
End: 2023-12-06

## 2023-12-06 ENCOUNTER — ANESTHESIA (OUTPATIENT)
Dept: SURGERY | Facility: AMBULATORY SURGERY CENTER | Age: 63
End: 2023-12-06
Payer: COMMERCIAL

## 2023-12-06 ENCOUNTER — NURSE TRIAGE (OUTPATIENT)
Dept: NURSING | Facility: CLINIC | Age: 63
End: 2023-12-06
Payer: COMMERCIAL

## 2023-12-06 ENCOUNTER — NURSE TRIAGE (OUTPATIENT)
Dept: FAMILY MEDICINE | Facility: CLINIC | Age: 63
End: 2023-12-06
Payer: COMMERCIAL

## 2023-12-06 ENCOUNTER — OFFICE VISIT (OUTPATIENT)
Dept: URGENT CARE | Facility: URGENT CARE | Age: 63
End: 2023-12-06
Payer: COMMERCIAL

## 2023-12-06 VITALS
SYSTOLIC BLOOD PRESSURE: 158 MMHG | OXYGEN SATURATION: 97 % | RESPIRATION RATE: 12 BRPM | HEART RATE: 46 BPM | BODY MASS INDEX: 26.74 KG/M2 | TEMPERATURE: 96.8 F | WEIGHT: 145 LBS | DIASTOLIC BLOOD PRESSURE: 73 MMHG

## 2023-12-06 VITALS
HEART RATE: 56 BPM | DIASTOLIC BLOOD PRESSURE: 60 MMHG | SYSTOLIC BLOOD PRESSURE: 141 MMHG | TEMPERATURE: 97.3 F | OXYGEN SATURATION: 98 % | RESPIRATION RATE: 18 BRPM

## 2023-12-06 DIAGNOSIS — Z98.890 S/P ARTHROSCOPY OF LEFT KNEE: Primary | ICD-10-CM

## 2023-12-06 DIAGNOSIS — M23.204 OLD TEAR OF MEDIAL MENISCUS OF LEFT KNEE, UNSPECIFIED TEAR TYPE: ICD-10-CM

## 2023-12-06 DIAGNOSIS — S83.282D TEAR OF LATERAL MENISCUS OF LEFT KNEE, CURRENT, UNSPECIFIED TEAR TYPE, SUBSEQUENT ENCOUNTER: ICD-10-CM

## 2023-12-06 DIAGNOSIS — M94.262 CHONDROMALACIA OF LEFT KNEE: ICD-10-CM

## 2023-12-06 DIAGNOSIS — R58 HEMORRHAGE: ICD-10-CM

## 2023-12-06 DIAGNOSIS — T81.31XA DEHISCENCE OF OPERATIVE WOUND, INITIAL ENCOUNTER: Primary | ICD-10-CM

## 2023-12-06 PROCEDURE — G8916 PT W IV AB GIVEN ON TIME: HCPCS

## 2023-12-06 PROCEDURE — 99213 OFFICE O/P EST LOW 20 MIN: CPT | Performed by: PHYSICIAN ASSISTANT

## 2023-12-06 PROCEDURE — 29880 ARTHRS KNE SRG MNISECTMY M&L: CPT | Mod: LT

## 2023-12-06 PROCEDURE — 29880 ARTHRS KNE SRG MNISECTMY M&L: CPT | Mod: LT | Performed by: ORTHOPAEDIC SURGERY

## 2023-12-06 PROCEDURE — G8907 PT DOC NO EVENTS ON DISCHARG: HCPCS

## 2023-12-06 RX ORDER — ACETAMINOPHEN 325 MG/1
650 TABLET ORAL EVERY 4 HOURS PRN
Qty: 50 TABLET | Refills: 0 | Status: SHIPPED | OUTPATIENT
Start: 2023-12-06 | End: 2024-09-18

## 2023-12-06 RX ORDER — AMOXICILLIN 250 MG
1-2 CAPSULE ORAL 2 TIMES DAILY
Qty: 30 TABLET | Refills: 0 | Status: SHIPPED | OUTPATIENT
Start: 2023-12-06 | End: 2023-12-21

## 2023-12-06 RX ORDER — FENTANYL CITRATE 50 UG/ML
INJECTION, SOLUTION INTRAMUSCULAR; INTRAVENOUS PRN
Status: DISCONTINUED | OUTPATIENT
Start: 2023-12-06 | End: 2023-12-06

## 2023-12-06 RX ORDER — ONDANSETRON 2 MG/ML
4 INJECTION INTRAMUSCULAR; INTRAVENOUS EVERY 30 MIN PRN
Status: DISCONTINUED | OUTPATIENT
Start: 2023-12-06 | End: 2023-12-07 | Stop reason: HOSPADM

## 2023-12-06 RX ORDER — ACETAMINOPHEN 325 MG/1
975 TABLET ORAL ONCE
Status: DISCONTINUED | OUTPATIENT
Start: 2023-12-06 | End: 2023-12-07 | Stop reason: HOSPADM

## 2023-12-06 RX ORDER — ONDANSETRON 4 MG/1
4 TABLET, ORALLY DISINTEGRATING ORAL EVERY 30 MIN PRN
Status: DISCONTINUED | OUTPATIENT
Start: 2023-12-06 | End: 2023-12-07 | Stop reason: HOSPADM

## 2023-12-06 RX ORDER — ONDANSETRON 4 MG/1
4 TABLET, ORALLY DISINTEGRATING ORAL EVERY 8 HOURS PRN
Qty: 4 TABLET | Refills: 0 | Status: SHIPPED | OUTPATIENT
Start: 2023-12-06 | End: 2023-12-21

## 2023-12-06 RX ORDER — CEFAZOLIN SODIUM 2 G/50ML
2 SOLUTION INTRAVENOUS SEE ADMIN INSTRUCTIONS
Status: DISCONTINUED | OUTPATIENT
Start: 2023-12-06 | End: 2023-12-07 | Stop reason: HOSPADM

## 2023-12-06 RX ORDER — EPHEDRINE SULFATE 50 MG/ML
INJECTION, SOLUTION INTRAMUSCULAR; INTRAVENOUS; SUBCUTANEOUS PRN
Status: DISCONTINUED | OUTPATIENT
Start: 2023-12-06 | End: 2023-12-06

## 2023-12-06 RX ORDER — HYDROCODONE BITARTRATE AND ACETAMINOPHEN 5; 325 MG/1; MG/1
1-2 TABLET ORAL EVERY 4 HOURS PRN
Qty: 10 TABLET | Refills: 0 | Status: SHIPPED | OUTPATIENT
Start: 2023-12-06 | End: 2023-12-21

## 2023-12-06 RX ORDER — CEFAZOLIN SODIUM 2 G/50ML
2 SOLUTION INTRAVENOUS
Status: COMPLETED | OUTPATIENT
Start: 2023-12-06 | End: 2023-12-06

## 2023-12-06 RX ORDER — OXYCODONE HYDROCHLORIDE 5 MG/1
10 TABLET ORAL
Status: DISCONTINUED | OUTPATIENT
Start: 2023-12-06 | End: 2023-12-07 | Stop reason: HOSPADM

## 2023-12-06 RX ORDER — LIDOCAINE 40 MG/G
CREAM TOPICAL
Status: DISCONTINUED | OUTPATIENT
Start: 2023-12-06 | End: 2023-12-07 | Stop reason: HOSPADM

## 2023-12-06 RX ORDER — FENTANYL CITRATE 50 UG/ML
25 INJECTION, SOLUTION INTRAMUSCULAR; INTRAVENOUS EVERY 5 MIN PRN
Status: DISCONTINUED | OUTPATIENT
Start: 2023-12-06 | End: 2023-12-07 | Stop reason: HOSPADM

## 2023-12-06 RX ORDER — HYDROCODONE BITARTRATE AND ACETAMINOPHEN 5; 325 MG/1; MG/1
1 TABLET ORAL
Status: DISCONTINUED | OUTPATIENT
Start: 2023-12-06 | End: 2023-12-07 | Stop reason: HOSPADM

## 2023-12-06 RX ORDER — ACETAMINOPHEN 325 MG/1
650 TABLET ORAL
Status: DISCONTINUED | OUTPATIENT
Start: 2023-12-06 | End: 2023-12-07 | Stop reason: HOSPADM

## 2023-12-06 RX ORDER — ONDANSETRON 2 MG/ML
INJECTION INTRAMUSCULAR; INTRAVENOUS PRN
Status: DISCONTINUED | OUTPATIENT
Start: 2023-12-06 | End: 2023-12-06

## 2023-12-06 RX ORDER — PROPOFOL 10 MG/ML
INJECTION, EMULSION INTRAVENOUS CONTINUOUS PRN
Status: DISCONTINUED | OUTPATIENT
Start: 2023-12-06 | End: 2023-12-06

## 2023-12-06 RX ORDER — SODIUM CHLORIDE, SODIUM LACTATE, POTASSIUM CHLORIDE, CALCIUM CHLORIDE 600; 310; 30; 20 MG/100ML; MG/100ML; MG/100ML; MG/100ML
INJECTION, SOLUTION INTRAVENOUS CONTINUOUS
Status: DISCONTINUED | OUTPATIENT
Start: 2023-12-06 | End: 2023-12-07 | Stop reason: HOSPADM

## 2023-12-06 RX ORDER — FENTANYL CITRATE 50 UG/ML
50 INJECTION, SOLUTION INTRAMUSCULAR; INTRAVENOUS EVERY 5 MIN PRN
Status: DISCONTINUED | OUTPATIENT
Start: 2023-12-06 | End: 2023-12-07 | Stop reason: HOSPADM

## 2023-12-06 RX ORDER — BUPIVACAINE HYDROCHLORIDE AND EPINEPHRINE 2.5; 5 MG/ML; UG/ML
INJECTION, SOLUTION INFILTRATION; PERINEURAL PRN
Status: DISCONTINUED | OUTPATIENT
Start: 2023-12-06 | End: 2023-12-06 | Stop reason: HOSPADM

## 2023-12-06 RX ORDER — PROPOFOL 10 MG/ML
INJECTION, EMULSION INTRAVENOUS PRN
Status: DISCONTINUED | OUTPATIENT
Start: 2023-12-06 | End: 2023-12-06

## 2023-12-06 RX ORDER — LIDOCAINE HYDROCHLORIDE 20 MG/ML
INJECTION, SOLUTION INFILTRATION; PERINEURAL PRN
Status: DISCONTINUED | OUTPATIENT
Start: 2023-12-06 | End: 2023-12-06

## 2023-12-06 RX ORDER — OXYCODONE HYDROCHLORIDE 5 MG/1
5 TABLET ORAL
Status: DISCONTINUED | OUTPATIENT
Start: 2023-12-06 | End: 2023-12-07 | Stop reason: HOSPADM

## 2023-12-06 RX ORDER — ACETAMINOPHEN 325 MG/1
975 TABLET ORAL ONCE
Status: COMPLETED | OUTPATIENT
Start: 2023-12-06 | End: 2023-12-06

## 2023-12-06 RX ORDER — DEXAMETHASONE SODIUM PHOSPHATE 4 MG/ML
INJECTION, SOLUTION INTRA-ARTICULAR; INTRALESIONAL; INTRAMUSCULAR; INTRAVENOUS; SOFT TISSUE PRN
Status: DISCONTINUED | OUTPATIENT
Start: 2023-12-06 | End: 2023-12-06

## 2023-12-06 RX ADMIN — CEFAZOLIN SODIUM 2 G: 2 SOLUTION INTRAVENOUS at 09:33

## 2023-12-06 RX ADMIN — ACETAMINOPHEN 975 MG: 325 TABLET ORAL at 09:14

## 2023-12-06 RX ADMIN — EPHEDRINE SULFATE 5 MG: 50 INJECTION, SOLUTION INTRAMUSCULAR; INTRAVENOUS; SUBCUTANEOUS at 09:55

## 2023-12-06 RX ADMIN — SODIUM CHLORIDE, SODIUM LACTATE, POTASSIUM CHLORIDE, CALCIUM CHLORIDE: 600; 310; 30; 20 INJECTION, SOLUTION INTRAVENOUS at 09:29

## 2023-12-06 RX ADMIN — DEXAMETHASONE SODIUM PHOSPHATE 4 MG: 4 INJECTION, SOLUTION INTRA-ARTICULAR; INTRALESIONAL; INTRAMUSCULAR; INTRAVENOUS; SOFT TISSUE at 09:50

## 2023-12-06 RX ADMIN — FENTANYL CITRATE 25 MCG: 50 INJECTION, SOLUTION INTRAMUSCULAR; INTRAVENOUS at 09:43

## 2023-12-06 RX ADMIN — PROPOFOL 100 MCG/KG/MIN: 10 INJECTION, EMULSION INTRAVENOUS at 09:43

## 2023-12-06 RX ADMIN — LIDOCAINE HYDROCHLORIDE 60 MG: 20 INJECTION, SOLUTION INFILTRATION; PERINEURAL at 09:43

## 2023-12-06 RX ADMIN — EPHEDRINE SULFATE 5 MG: 50 INJECTION, SOLUTION INTRAMUSCULAR; INTRAVENOUS; SUBCUTANEOUS at 10:16

## 2023-12-06 RX ADMIN — PROPOFOL 150 MG: 10 INJECTION, EMULSION INTRAVENOUS at 09:43

## 2023-12-06 RX ADMIN — ONDANSETRON 4 MG: 2 INJECTION INTRAMUSCULAR; INTRAVENOUS at 09:50

## 2023-12-06 RX ADMIN — EPHEDRINE SULFATE 5 MG: 50 INJECTION, SOLUTION INTRAMUSCULAR; INTRAVENOUS; SUBCUTANEOUS at 09:48

## 2023-12-06 ASSESSMENT — ENCOUNTER SYMPTOMS: DYSRHYTHMIAS: 1

## 2023-12-06 NOTE — DISCHARGE INSTRUCTIONS
Tylenol 975 mg was given at 915am, your next dose may be given at 3:15pm.    You should not take more then 4,000 mg of tylenol/acetaminophen in a 24 hour period.

## 2023-12-06 NOTE — PROGRESS NOTES
Chief Complaint   Patient presents with    Urgent Care    Post-op Knees     Patient had surgery today on left knee states after arriving home bandage started bleeding thru called triage and came here        ASSESSMENT/PLAN:  Johan was seen today for urgent care and post-op knees.    Diagnoses and all orders for this visit:    Dehiscence of operative wound, initial encounter    Hemorrhage    Patient still actively bleeding, has bled through several layers of gauze and wrappings.  Recommend further evaluation in the emergency room    Enrico Mccoy PA-C      SUBJECTIVE:  Esau is a 63 year old male who presents to urgent care with bleeding from his left knee.  He underwent arthroscopic knee surgery today and has had postop bleeding which has soaked through his bandages.  No lightheadedness, no chest pain, no syncope    ROS: Pertinent ROS neg other than the symptoms noted above in the HPI.     OBJECTIVE:  BP (!) 141/60   Pulse 56   Temp 97.3  F (36.3  C) (Tympanic)   Resp 18   SpO2 98%    GENERAL: healthy, alert and no distress  SKIN: Bleeding from the surgical wound over left knee    DIAGNOSTICS    No results found for any visits on 12/06/23.     Current Outpatient Medications   Medication    acetaminophen (TYLENOL) 325 MG tablet    albuterol (PROAIR HFA/PROVENTIL HFA/VENTOLIN HFA) 108 (90 Base) MCG/ACT inhaler    apixaban ANTICOAGULANT (ELIQUIS) 5 MG tablet    felodipine ER (PLENDIL) 5 MG 24 hr tablet    HYDROcodone-acetaminophen (NORCO) 5-325 MG tablet    levothyroxine (SYNTHROID/LEVOTHROID) 100 MCG tablet    lisinopril-hydrochlorothiazide (ZESTORETIC) 20-25 MG tablet    ondansetron (ZOFRAN ODT) 4 MG ODT tab    senna-docusate (SENOKOT-S/PERICOLACE) 8.6-50 MG tablet    simvastatin (ZOCOR) 20 MG tablet    sotalol (BETAPACE) 80 MG tablet     No current facility-administered medications for this visit.     Facility-Administered Medications Ordered in Other Visits   Medication    acetaminophen (TYLENOL) tablet 650  mg    acetaminophen (TYLENOL) tablet 975 mg    ceFAZolin (ANCEF) 2 g in dextrose 50 mL intermittent infusion    fentaNYL (PF) (SUBLIMAZE) injection 25 mcg    fentaNYL (PF) (SUBLIMAZE) injection 50 mcg    HYDROcodone-acetaminophen (NORCO) 5-325 MG per tablet 1 tablet    HYDROmorphone (DILAUDID) injection 0.2 mg    HYDROmorphone (DILAUDID) injection 0.4 mg    lactated ringers infusion    lactated ringers infusion    lidocaine (LMX4) kit    lidocaine 1 % 0.1-1 mL    ondansetron (ZOFRAN ODT) ODT tab 4 mg    Or    ondansetron (ZOFRAN) injection 4 mg    ondansetron (ZOFRAN ODT) ODT tab 4 mg    Or    ondansetron (ZOFRAN) injection 4 mg    oxyCODONE (ROXICODONE) tablet 10 mg    oxyCODONE (ROXICODONE) tablet 5 mg    prochlorperazine (COMPAZINE) injection 5 mg    prochlorperazine (COMPAZINE) injection 5 mg    sodium chloride (PF) 0.9% PF flush 3 mL    sodium chloride (PF) 0.9% PF flush 3 mL      Patient Active Problem List   Diagnosis    Hypothyroidism    Hyperlipidemia LDL goal <130    Benign hypertension    Erectile dysfunction    Left knee pain    Derangement of medial meniscus of left knee    Other tear of lateral meniscus of left knee as current injury, subsequent encounter    Atrial flutter, unspecified type (H)    Old tear of medial meniscus of left knee, unspecified tear type    Tear of lateral meniscus of left knee, current, unspecified tear type, subsequent encounter    Chondromalacia of left knee      Past Medical History:   Diagnosis Date    Arthritis 2019    Atrial flutter, unspecified type (H) 10/17/2023    Hypertension 2010    Longtime ago    Thyroid disease 1996     Past Surgical History:   Procedure Laterality Date    COLONOSCOPY  2018    HERNIA REPAIR  2018     Family History   Problem Relation Age of Onset    Cerebrovascular Disease Mother     Hypertension Mother      Social History     Tobacco Use    Smoking status: Never     Passive exposure: Past (Dad smoked in the home while he was growing up.)     Smokeless tobacco: Never   Substance Use Topics    Alcohol use: Yes     Comment: weekends              The plan of care was discussed with the patient. They understand and agree with the course of treatment prescribed. A printed summary was given including instructions and medications.  The use of Dragon/Tamoco dictation services may have been used to construct the content in this note; any grammatical or spelling errors are non-intentional. Please contact the author of this note directly if you are in need of any clarification.

## 2023-12-06 NOTE — ANESTHESIA POSTPROCEDURE EVALUATION
Patient: Esau Avendano    Procedure: Procedure(s):  ARTHROSCOPY,LEFT KNEE WITH PARTIAL MEDIAL AND LATERAL MENISECTOMY       Anesthesia Type:  General    Note:  Disposition: Outpatient   Postop Pain Control: Uneventful            Sign Out: Well controlled pain   PONV: No   Neuro/Psych: Uneventful            Sign Out: Acceptable/Baseline neuro status   Airway/Respiratory: Uneventful            Sign Out: Acceptable/Baseline resp. status   CV/Hemodynamics: Uneventful            Sign Out: Acceptable CV status; No obvious hypovolemia; No obvious fluid overload   Other NRE: NONE   DID A NON-ROUTINE EVENT OCCUR? No       Last vitals:  Vitals Value Taken Time   /67 12/06/23 1045   Temp 96.8  F (36  C) 12/06/23 1035   Pulse 44 12/06/23 1045   Resp 14 12/06/23 1045   SpO2 100 % 12/06/23 1045       Electronically Signed By: Toney Grande MD  December 6, 2023  12:44 PM

## 2023-12-06 NOTE — ANESTHESIA PREPROCEDURE EVALUATION
Anesthesia Pre-Procedure Evaluation    Patient: Esau Avendano   MRN: [id number] : 1960        Procedure : Procedure(s):  ARTHROSCOPY,LEFT KNEE WITH MENISECTOMY , POSSIBLE CHONDROPLASTY          Past Medical History:   Diagnosis Date     Arthritis 2019     Atrial flutter, unspecified type (H) 10/17/2023     Hypertension 2010    Longtime ago     Thyroid disease 1996      Past Surgical History:   Procedure Laterality Date     COLONOSCOPY  2018     HERNIA REPAIR  2018      Allergies   Allergen Reactions     Amoxicillin-Pot Clavulanate Nausea and Vomiting      Social History     Tobacco Use     Smoking status: Never     Passive exposure: Past (Dad smoked in the home while he was growing up.)     Smokeless tobacco: Never   Substance Use Topics     Alcohol use: Yes     Comment: weekends      Wt Readings from Last 1 Encounters:   23 65.8 kg (145 lb)        Anesthesia Evaluation   Pt has had prior anesthetic. Type: General and MAC.    No history of anesthetic complications       ROS/MED HX  ENT/Pulmonary:  - neg pulmonary ROS     Neurologic:  - neg neurologic ROS     Cardiovascular: Comment: S/P ablation for atrial flutter 1 month ago.  No new episodes reported.    (+) Dyslipidemia hypertension- -   -  - -   Taking blood thinners Pt has received instructions: Instructions Given to patient: Anticoagulation stopped.  May resume after surgery..                   dysrhythmias, a-flutter,             METS/Exercise Tolerance:     Hematologic:  - neg hematologic  ROS     Musculoskeletal:  - neg musculoskeletal ROS     GI/Hepatic:  - neg GI/hepatic ROS     Renal/Genitourinary:  - neg Renal ROS     Endo:     (+)          thyroid problem, hypothyroidism,           Psychiatric/Substance Use:  - neg psychiatric ROS     Infectious Disease:  - neg infectious disease ROS     Malignancy:  - neg malignancy ROS     Other:  - neg other ROS          Physical Exam    Airway  airway exam normal           Respiratory Devices and  "Support         Dental       (+) Completely normal teeth      Cardiovascular   cardiovascular exam normal          Pulmonary   pulmonary exam normal            Other findings: ECG today has sinus bradycardia with a right bundle branch block.  OUTSIDE LABS:  CBC:   Lab Results   Component Value Date    WBC 5.3 11/11/2023    HGB 14.2 11/11/2023    HCT 40.6 11/11/2023     11/11/2023     BMP:   Lab Results   Component Value Date     11/30/2023     (L) 10/17/2023    POTASSIUM 4.2 11/30/2023    POTASSIUM 4.2 10/17/2023    CHLORIDE 97 (L) 11/30/2023    CHLORIDE 96 (L) 10/17/2023    CO2 28 11/30/2023    CO2 26 10/17/2023    BUN 16.6 11/30/2023    BUN 15.6 10/17/2023    CR 0.85 11/30/2023    CR 0.90 10/17/2023     (H) 11/30/2023     (H) 10/17/2023     COAGS: No results found for: \"PTT\", \"INR\", \"FIBR\"  POC: No results found for: \"BGM\", \"HCG\", \"HCGS\"  HEPATIC:   Lab Results   Component Value Date    ALT 38 05/04/2012     OTHER:   Lab Results   Component Value Date    MADISON 9.7 11/30/2023    TSH 1.33 04/18/2023       Anesthesia Plan    ASA Status:  3       Anesthesia Type: General.     - Airway: LMA   Induction: Intravenous, Propofol.   Maintenance: Balanced.        Consents    Anesthesia Plan(s) and associated risks, benefits, and realistic alternatives discussed. Questions answered and patient/representative(s) expressed understanding.     - Discussed:     - Discussed with:  Patient      - Extended Intubation/Ventilatory Support Discussed: No.      - Patient is DNR/DNI Status: No     Use of blood products discussed: No .     Postoperative Care    Pain management: IV analgesics, Oral pain medications.   PONV prophylaxis: Ondansetron (or other 5HT-3), Background Propofol Infusion     Comments:               Toney Grande MD    I have reviewed the pertinent notes and labs in the chart from the past 30 days and (re)examined the patient.  Any updates or changes from those notes are reflected in this " "note.            # Drug Induced Coagulation Defect: home medication list includes an anticoagulant medication   # Overweight: Estimated body mass index is 26.74 kg/m  as calculated from the following:    Height as of 11/30/23: 1.568 m (5' 1.75\").    Weight as of this encounter: 65.8 kg (145 lb).      "

## 2023-12-06 NOTE — ANESTHESIA PROCEDURE NOTES
Airway    Staff -        Performed By: CRNAIndications and Patient Condition       Induction type:intravenous       Mask difficulty assessment: 1 - vent by mask    Final Airway Details       Final airway type: supraglottic airway    Supraglottic Airway Details        Type: LMA       Brand: LMA Unique       LMA size: 4    Post intubation assessment        Placement verified by: capnometry, equal breath sounds and chest rise        Number of attempts at approach: 1       Ease of procedure: easy       Dentition: Intact and Unchanged

## 2023-12-06 NOTE — TELEPHONE ENCOUNTER
Pt's wife calling, verbal consent given to communicate.  Pt had knee surgery today, just discharged from hospital, and bandage is soaked through with blood.     Triage to see HCP today, care advice given. Warm transfer call to clinic.    Gina Su RN, BSN  12/6/2023 at 2:02 PM  [facility]        Reason for Disposition   Dressing soaked with blood or body fluid (e.g., drainage)    Additional Information   Negative: Major abdominal surgical incision and wound gaping open with visible internal organs   Negative: Sounds like a life-threatening emergency to the triager   Negative: Bleeding from incision and won't stop after 10 minutes of direct pressure   Negative: Bleeding (more than a few drops) from incision and after tracheostomy or blood vessel surgery (e.g., carotid endarterectomy, femoral bypass graft, kidney dialysis fistula)   Negative: Bright red, wide-spread, sunburn-like rash   Negative: SEVERE pain in the incision   Negative: Incision gaping open and < 2 days (48 hours) since wound re-opened   Negative: Incision gaping open and length of opening > 2 inches (5 cm)   Negative: Patient sounds very sick or weak to the triager   Negative: Sounds like a serious complication to the triager   Negative: Fever > 100.4 F (38.0 C)   Negative: Incision looks infected (spreading redness, pain)   Negative: Red streak runs from the incision and longer than 1 inch (2.5 cm)   Negative: Pus or bad-smelling fluid draining from incision    Protocols used: Post-Op Incision Symptoms and [id number]

## 2023-12-06 NOTE — TELEPHONE ENCOUNTER
Discussed with patient Johan now.  He states he is now at [facility] ED at recommendation of KHALIDA CHAPIN .  We discussed need for overwrapping this knee/ portal sites with additional dressing and /or ace wrap. Re:  Most likely this is minor bleeding, for which the dressings just need to be reinforced, elevate, ice. It is unlikely to be something that needs to go to the ER.   P: We offered to have patient drive to our clinic ([facility]) today for a dressing change but he states he is already in ED room. I advised if his wait is going to be long he can ask ED if they can discharge him to our clinic for this.  He agrees with treatment plan.      Eric MEDEIROS

## 2023-12-06 NOTE — TELEPHONE ENCOUNTER
"Patient was transferred to triage after Lakshmi, a  from [facility] had taken a note for surgeon to return patient's call, but she stated she though she should also have patient speak with a triage nurse for symptoms.    Patient stated surgery was today for meniscus.  Bleeding is soaking through bandage, jeans, and onto cold compress that was helping with pain/ inflammation.    Patient advised to be seen in office today per protocol.  Patient stated they are close to an urgent care and will go there now.    Reason for Disposition   Dressing soaked with blood or body fluid (e.g., drainage)    Additional Information   Negative: Major abdominal surgical incision and wound gaping open with visible internal organs   Negative: Sounds like a life-threatening emergency to the triager   Negative: Bleeding from incision and won't stop after 10 minutes of direct pressure   Negative: Bleeding (more than a few drops) from incision and after tracheostomy or blood vessel surgery (e.g., carotid endarterectomy, femoral bypass graft, kidney dialysis fistula)   Negative: Bright red, wide-spread, sunburn-like rash   Negative: SEVERE pain in the incision   Negative: Incision gaping open and < 2 days (48 hours) since wound re-opened   Negative: Incision gaping open and length of opening > 2 inches (5 cm)   Negative: Patient sounds very sick or weak to the triager   Negative: Sounds like a serious complication to the triager   Negative: Fever > 100.4 F (38.0 C)   Negative: Incision looks infected (spreading redness, pain)   Negative: Red streak runs from the incision and longer than 1 inch (2.5 cm)   Negative: Pus or bad-smelling fluid draining from incision    Answer Assessment - Initial Assessment Questions  1. SYMPTOM: \"What's the main symptom you're concerned about?\" (e.g., drainage, incision opening up, pain, redness)      Bleeding - meniscus surgery - blood on bandage -     2. ONSET: \"When did this  start?\"      " "Today    3. SURGERY: \"What surgery did you have?\"      Meniscus      4. DATE of SURGERY: \"When was the surgery?\"       Today    5. INCISION SITE: \"Where is the incision located?\"       Knee    6. REDNESS: \"Is there any redness at the incision site?\" If Yes, ask: \"How wide across is the redness?\" (Inches, centimeters)       Unknown - covered    7. PAIN: \"Is there any pain?\" If Yes, ask: \"How bad is it?\"  (Scale 1-10; or mild, moderate, severe)    - NONE (0): no pain    - MILD (1-3): doesn't interfere with normal activities     - MODERATE (4-7): interferes with normal activities or awakens from sleep     - SEVERE (8-10): excruciating pain, unable to do any normal activities      5/10    8. BLEEDING: \"Is there any bleeding?\" If Yes, ask: \"How much?\" and \"Where?\"      Yes - saturated the whole bandage    9. DRAINAGE: \"Is there any drainage from the incision site?\" If Yes, ask: \"What color and how much?\" (e.g., red, cloudy, pus; drops, teaspoon)      Unknown    10. FEVER: \"Do you have a fever?\" If Yes, ask: \"What is your temperature, how was it measured, and when did it start?\"        No    11. OTHER SYMPTOMS: \"Do you have any other symptoms?\" (e.g., dizziness, rash elsewhere on body, shaking chills, weakness)        No    Protocols used: Post-Op Incision Symptoms and [id number]  Saundra Gomez RN    "

## 2023-12-06 NOTE — OP NOTE
OPERATIVE NOTE    Date of Procedure: December 6, 2023    PRE-OP DIAGNOSIS:  left knee lateral meniscus tear and osteoarthritis      POST-OP DIAGNOSIS:  left knee:  Medial meniscus tear   Lateral meniscus tear     PROCEDURE: Arthroscopic partial medial meniscectomy and partial lateral menisectomy, left knee.    SURGEON: Lisandro    ASSISTANT(S):  JOSH Woodard    ANESTHESIA:  General    EBL: 3cc  Complications: none   Specimen: none     INDICATIONS:  Patient is a 63 year old male with left knee symptoms including catching and locking for over a year.  History of previous partial medial meniscectomy.  Clinical evaluation, including MRI was consistent with a displaced flap tear of the lateral meniscus, as well as some degenerative changes in the medial compartment.   Has failed conservative treatment.  Informed consent was obtained for the procedure, The procedure had been discussed in detail with the patient, including alternatives to the proposed procedure, and expected recovery.  Risks were discussed, including, but not limited to infection, neurovascular injury, DVT/PE, failure to relieve pain, and anesthetic complications.      Surgical site was marked.    PROCEDURE IN DETAIL  The patient was taken to the operating room and placed on the  operating table in the supine position. General anesthesia was successfully achieved. Pause for site confirmation was done.  Then, a tourniquet was placed around the proximal thigh, the leg placed in the leg welsh, and the limb was prepped and draped in the usual sterile fashion. The anticipated portal sites were injected with 1/4% Marcaine with epinephrine, and was also injected intra-articularly.   The limb exsanguinated and the tourniquet inflated to 250mm Hg. Standard superomedial inflow portal was established and inflow started.  Standard inferolateral scope portal was established, and the scope was introduced. Medial work portal was localized using a spinal needle.  The  portal was made and probe introduced.       The following findings were noted at arthroscopy :  Medial compartment: there was an area of Gr 4  articular cartilage changes on the medial femoral condyle, with grade 3 changes on the tibial surface.   There was a truncated medial meniscus from previous surgery, and there was a complex tear of the posterior junction area with some unstable meniscal tissue. // Lateral Compartment: Gr 1  articular cartilage changes.   There was some posterior medial lateral meniscus fraying, as well as unstable small apical tears of the lateral meniscus. The lateral gutter was explored and there was fatty-fibrous tissue that could be old meniscus tear, but I really couldn't identify a donor site on the lateral meniscus, but the anterior horn did seem a bit thinner than normal.  // Patellofemoral joint: Gr 2-3  articular cartilage changes  on the patella, Gr 1   articular cartilage changes on the trochlea.   A small medial plica noted. ACL intact.    Using a combination of arthroscopic rongeurs and the motorized shaver, the unstable portions of the medial and lateral meniscus were removed, and the remaining meniscal tissue was tapered.  Minor chondroplasty was performed in the medial compartment, on the medial femoral condyle.  The lateral gutter tissue collection was debrided with a shaver. Again, this didn't look like meniscal tissue.  The medial plica was debrided as well.     At this point, the excess fluid was suctioned from the knee, instruments were removed from the knee.  Steri strips were applied to the portal sites.  Additional 1/4% Marcaine was injected into the knee.  Sterile, compressive dressings were applied.  Estimated blood loss was negligible.  Patient was awakened, and transferred to the recovery room in stable condition.    AMY Mcmahon MD  Dept. Orthopedic Surgery  [facility]

## 2023-12-06 NOTE — ANESTHESIA CARE TRANSFER NOTE
Patient: Esau Avendano    Procedure: Procedure(s):  ARTHROSCOPY,LEFT KNEE WITH PARTIAL MEDIAL AND LATERAL MENISECTOMY       Diagnosis: Old tear of medial meniscus of left knee, unspecified tear type [M23.204]  Tear of lateral meniscus of left knee, current, unspecified tear type, subsequent encounter [S83.282D]  Chondromalacia of left knee [M94.262]  Diagnosis Additional Information: No value filed.    Anesthesia Type:   General     Note:      Level of Consciousness: awake  Oxygen Supplementation: face mask  Level of Supplemental Oxygen (L/min / FiO2): 5  Independent Airway: airway patency satisfactory and stable  Dentition: dentition unchanged  Vital Signs Stable: post-procedure vital signs reviewed and stable  Report to RN Given: handoff report given  Patient transferred to: PACU    Handoff Report: Identifed the Patient, Identified the Reponsible Provider, Reviewed the pertinent medical history, Discussed the surgical course, Reviewed Intra-OP anesthesia mangement and issues during anesthesia, Set expectations for post-procedure period and Allowed opportunity for questions and acknowledgement of understanding      Vitals:  Vitals Value Taken Time   BP 98/56    Temp 98    Pulse 46    Resp 16    SpO2 97        Electronically Signed By: KIRAN Wong CRNA  December 6, 2023  10:38 AM

## 2023-12-06 NOTE — TELEPHONE ENCOUNTER
[facility] Call Center    Phone Message    May a detailed message be left on voicemail: yes     Reason for Call: Other: Jordan call pt back regarding bleeding from post op surgery today 12/06/23 with DR. Mcmahon . Transfer patient to red line nurse triage line.    Action Taken: Other: [facility] Triage [[id number]]    Travel Screening: Not Applicable

## 2023-12-08 ENCOUNTER — TELEPHONE (OUTPATIENT)
Dept: ORTHOPEDICS | Facility: CLINIC | Age: 63
End: 2023-12-08
Payer: COMMERCIAL

## 2023-12-08 NOTE — TELEPHONE ENCOUNTER
[facility] Call Center     Phone Message     May a detailed message be left on voicemail: Yes     Reason for Call:  Patient had surgery with Dr. Mcmahon on Wednesday morning and took his bandages off today and he is bleeding from the site. Patient out gauze and bandages on the surgical site but it is still bleeding.

## 2023-12-08 NOTE — TELEPHONE ENCOUNTER
[facility] Call Center    Phone Message    May a detailed message be left on voicemail: yes     Reason for Call: Other: Post op pt surgery 12-06-23 with Dr. Mcmahon. Per patient he is still waiting for the care team to call him back regarding his bandages he took off. Having continuous bleeding.  TE was sent 12/08/23 around 1:40pm, and per patient he would like me to resend TE to care team. I will also transfer patient to switched board after hours [phone]  Action Taken: Other: [facility] Triage [[id number]]    Travel Screening: Not Applicable

## 2023-12-11 NOTE — TELEPHONE ENCOUNTER
Separate [employer] message regarding same.  Closing duplicate encounter.    Ct Morrison MSN, RN   Specialty Clinic, 12/11/2023 8:51 AM

## 2023-12-15 NOTE — PROGRESS NOTES
"Johan Avendano returns following left knee arthroscopy on 12/6/23    PROCEDURE: Arthroscopic partial medial meniscectomy and partial lateral menisectomy, left knee.     The following findings were noted at arthroscopy :  Medial compartment: there was an area of Gr 4  articular cartilage changes on the medial femoral condyle, with grade 3 changes on the tibial surface.   There was a truncated medial meniscus from previous surgery, and there was a complex tear of the posterior junction area with some unstable meniscal tissue. // Lateral Compartment: Gr 1  articular cartilage changes.   There was some posterior medial lateral meniscus fraying, as well as unstable small apical tears of the lateral meniscus. The lateral gutter was explored and there was fatty-fibrous tissue that could be old meniscus tear, but I really couldn't identify a donor site on the lateral meniscus, but the anterior horn did seem a bit thinner than normal.  // Patellofemoral joint: Gr 2-3  articular cartilage changes  on the patella, Gr 1   articular cartilage changes on the trochlea.   A small medial plica noted. ACL intact.     He reports that the pain is unchanged compared to preop.  Had some portal site bleeding due to Eliquis     Review of Systems:  Constitutional/General: Negative for fever, chills, change in weight  Integumentary/Skin: Negative for worrisome rashes, moles, or lesions  Neuro: Negative for weakness, dizziness, or paresthesias   Psychiatric: negative for changes in mood or affect    OBJECTIVE:  Physical Exam:  BP (!) 146/75 (BP Location: Left arm, Patient Position: Sitting, Cuff Size: Adult Regular)   Pulse 62   Ht 1.568 m (5' 1.75\")   Wt 65.8 kg (145 lb)   SpO2 100%   BMI 26.74 kg/m     General Appearance: healthy, alert and no distress   Skin: no suspicious lesions or rashes  Neuro: Normal strength and tone, mentation intact and speech normal  Vascular: good pulses, and cappillary refill   Lymph: no lymphadenopathy "   Psych:  mentation appears normal and affect normal/bright  Resp: no increased work of breathing    Right knee Exam:  Inspection: Wounds  healing well, no evidence of infection. Slight redness and warmth  Range of motion : 5-110  Calf non-tender     ASSESSMENT:   Doing well status post left knee arthroscopy     PLAN:   I reviewed the operative findings with him   Physical therapy ordered including  brace    Will continue range of motion and strengthening.  Scar massage.  Advance activity as tolerated.  RTC 3-4 weeks if still painful.    AMY Mcmahon MD  Dept. Orthopedic Surgery  [facility]

## 2023-12-21 ENCOUNTER — OFFICE VISIT (OUTPATIENT)
Dept: ORTHOPEDICS | Facility: CLINIC | Age: 63
End: 2023-12-21
Payer: COMMERCIAL

## 2023-12-21 VITALS
WEIGHT: 145 LBS | HEART RATE: 62 BPM | SYSTOLIC BLOOD PRESSURE: 146 MMHG | HEIGHT: 62 IN | OXYGEN SATURATION: 100 % | DIASTOLIC BLOOD PRESSURE: 75 MMHG | BODY MASS INDEX: 26.68 KG/M2

## 2023-12-21 DIAGNOSIS — Z98.890 S/P ARTHROSCOPY OF LEFT KNEE: Primary | ICD-10-CM

## 2023-12-21 PROCEDURE — 99024 POSTOP FOLLOW-UP VISIT: CPT | Performed by: ORTHOPAEDIC SURGERY

## 2023-12-21 ASSESSMENT — PAIN SCALES - GENERAL: PAINLEVEL: MODERATE PAIN (4)

## 2023-12-21 NOTE — LETTER
"    12/21/2023         RE: Esau Avendano  [address] [place] [address]        Dear Colleague,    Thank you for referring your patient, Esau Avendano, to the [facility]. Please see a copy of my visit note below.    Johan Avendano returns following left knee arthroscopy on 12/6/23    PROCEDURE: Arthroscopic partial medial meniscectomy and partial lateral menisectomy, left knee.     The following findings were noted at arthroscopy :  Medial compartment: there was an area of Gr 4  articular cartilage changes on the medial femoral condyle, with grade 3 changes on the tibial surface.   There was a truncated medial meniscus from previous surgery, and there was a complex tear of the posterior junction area with some unstable meniscal tissue. // Lateral Compartment: Gr 1  articular cartilage changes.   There was some posterior medial lateral meniscus fraying, as well as unstable small apical tears of the lateral meniscus. The lateral gutter was explored and there was fatty-fibrous tissue that could be old meniscus tear, but I really couldn't identify a donor site on the lateral meniscus, but the anterior horn did seem a bit thinner than normal.  // Patellofemoral joint: Gr 2-3  articular cartilage changes  on the patella, Gr 1   articular cartilage changes on the trochlea.   A small medial plica noted. ACL intact.     He reports that the pain is unchanged compared to preop.  Had some portal site bleeding due to Eliquis     Review of Systems:  Constitutional/General: Negative for fever, chills, change in weight  Integumentary/Skin: Negative for worrisome rashes, moles, or lesions  Neuro: Negative for weakness, dizziness, or paresthesias   Psychiatric: negative for changes in mood or affect    OBJECTIVE:  Physical Exam:  BP (!) 146/75 (BP Location: Left arm, Patient Position: Sitting, Cuff Size: Adult Regular)   Pulse 62   Ht 1.568 m (5' 1.75\")   Wt 65.8 kg (145 lb)   SpO2 100%   BMI " 26.74 kg/m     General Appearance: healthy, alert and no distress   Skin: no suspicious lesions or rashes  Neuro: Normal strength and tone, mentation intact and speech normal  Vascular: good pulses, and cappillary refill   Lymph: no lymphadenopathy   Psych:  mentation appears normal and affect normal/bright  Resp: no increased work of breathing    Right knee Exam:  Inspection: Wounds  healing well, no evidence of infection. Slight redness and warmth  Range of motion : 5-110  Calf non-tender     ASSESSMENT:   Doing well status post left knee arthroscopy     PLAN:   I reviewed the operative findings with him   Physical therapy ordered including  brace    Will continue range of motion and strengthening.  Scar massage.  Advance activity as tolerated.  RTC 3-4 weeks if still painful.    AMY Mcmahon MD  Dept. Orthopedic Surgery  [facility]      Again, thank you for allowing me to participate in the care of your patient.        Sincerely,        Koby Mcmahon MD

## 2023-12-30 ENCOUNTER — ANCILLARY PROCEDURE (OUTPATIENT)
Dept: MRI IMAGING | Facility: CLINIC | Age: 63
End: 2023-12-30
Attending: PHYSICIAN ASSISTANT
Payer: COMMERCIAL

## 2023-12-30 DIAGNOSIS — R97.20 ELEVATED PROSTATE SPECIFIC ANTIGEN (PSA): ICD-10-CM

## 2023-12-30 PROCEDURE — 76377 3D RENDER W/INTRP POSTPROCES: CPT | Performed by: RADIOLOGY

## 2023-12-30 PROCEDURE — 72197 MRI PELVIS W/O & W/DYE: CPT | Performed by: RADIOLOGY

## 2023-12-30 PROCEDURE — A9585 GADOBUTROL INJECTION: HCPCS | Performed by: RADIOLOGY

## 2023-12-30 RX ORDER — GADOBUTROL 604.72 MG/ML
7.5 INJECTION INTRAVENOUS ONCE
Status: COMPLETED | OUTPATIENT
Start: 2023-12-30 | End: 2023-12-30

## 2023-12-30 RX ADMIN — GADOBUTROL 6.5 ML: 604.72 INJECTION INTRAVENOUS at 13:54

## 2023-12-30 NOTE — DISCHARGE INSTRUCTIONS
MRI Contrast Discharge Instructions    The IV contrast you received today will pass out of your body in your  urine. This will happen in the next 24 hours. You will not feel this process.  Your urine will not change color.    Drink at least 4 extra glasses of water or juice today (unless your doctor  has restricted your fluids). This reduces the stress on your kidneys.  You may take your regular medicines.    If you are on dialysis: It is best to have dialysis today.    If you have a reaction: Most reactions happen right away. If you have  any new symptoms after leaving the hospital (such as hives or swelling),  call your hospital at the correct number below. Or call your family doctor.  If you have breathing distress or wheezing, call 911.    Special instructions: ***    I have read and understand the above information.    Signature:______________________________________ Date:___________    Staff:__________________________________________ Date:___________     Time:__________    [place] [facility] Departments:    ___[place]: [phone]  ___[facility]: [phone]  ___[place]: [phone] ___[facility]: [phone]  ___[facility]: [phone]  ___[facility]: [phone]  ___[phone]  ___[facility]: [phone]  ___[phone]

## 2024-01-10 ENCOUNTER — TELEPHONE (OUTPATIENT)
Dept: FAMILY MEDICINE | Facility: CLINIC | Age: 64
End: 2024-01-10
Payer: COMMERCIAL

## 2024-01-10 NOTE — TELEPHONE ENCOUNTER
Patient calling for results of MRI of Prostate that was done 12/30/23. Please advise and call patient.

## 2024-01-10 NOTE — TELEPHONE ENCOUNTER
Let frankie know that the suspicious area in his prostate noted on his mri from 2 yrs ago, remains relatively unchanged, however , I'd like dr lezama to weigh in on this . Would recommend frankie f/up with him

## 2024-01-10 NOTE — TELEPHONE ENCOUNTER
Left general message on patient's VM explaining that the imaging remains relatively unchanged compared to previous imaging, however, MILLI Castillo would still like him to follow-up with Dr. Courtney to have him weigh in on this.  Asked that patient call us back with any questions or if he would like more details      Romulo Jackson RN  [facility]

## 2024-04-03 ENCOUNTER — THERAPY VISIT (OUTPATIENT)
Dept: PHYSICAL THERAPY | Facility: CLINIC | Age: 64
End: 2024-04-03
Payer: COMMERCIAL

## 2024-04-03 DIAGNOSIS — G89.29 CHRONIC PAIN OF LEFT KNEE: Primary | ICD-10-CM

## 2024-04-03 DIAGNOSIS — M17.12 OSTEOARTHRITIS OF LEFT KNEE, UNSPECIFIED OSTEOARTHRITIS TYPE: ICD-10-CM

## 2024-04-03 DIAGNOSIS — M25.562 CHRONIC PAIN OF LEFT KNEE: Primary | ICD-10-CM

## 2024-04-03 PROCEDURE — 97110 THERAPEUTIC EXERCISES: CPT | Mod: GP | Performed by: PHYSICAL THERAPIST

## 2024-04-03 PROCEDURE — 97161 PT EVAL LOW COMPLEX 20 MIN: CPT | Mod: GP | Performed by: PHYSICAL THERAPIST

## 2024-04-03 ASSESSMENT — ACTIVITIES OF DAILY LIVING (ADL)
STAND: ACTIVITY IS NOT DIFFICULT
SQUAT: ACTIVITY IS SOMEWHAT DIFFICULT
HOW_WOULD_YOU_RATE_THE_CURRENT_FUNCTION_OF_YOUR_KNEE_DURING_YOUR_USUAL_DAILY_ACTIVITIES_ON_A_SCALE_FROM_0_TO_100_WITH_100_BEING_YOUR_LEVEL_OF_KNEE_FUNCTION_PRIOR_TO_YOUR_INJURY_AND_0_BEING_THE_INABILITY_TO_PERFORM_ANY_OF_YOUR_USUAL_DAILY_ACTIVITIES?: 90
SWELLING: I HAVE THE SYMPTOM BUT IT DOES NOT AFFECT MY ACTIVITY
KNEEL ON THE FRONT OF YOUR KNEE: ACTIVITY IS SOMEWHAT DIFFICULT
GO DOWN STAIRS: ACTIVITY IS MINIMALLY DIFFICULT
KNEE_ACTIVITY_OF_DAILY_LIVING_SCORE: 80
RAW_SCORE: 56
WALK: ACTIVITY IS MINIMALLY DIFFICULT
PAIN: THE SYMPTOM AFFECTS MY ACTIVITY SLIGHTLY
SIT WITH YOUR KNEE BENT: ACTIVITY IS NOT DIFFICULT
RISE FROM A CHAIR: ACTIVITY IS NOT DIFFICULT
HOW_WOULD_YOU_RATE_THE_OVERALL_FUNCTION_OF_YOUR_KNEE_DURING_YOUR_USUAL_DAILY_ACTIVITIES?: NEARLY NORMAL
WEAKNESS: I DO NOT HAVE THE SYMPTOM
KNEE_ACTIVITY_OF_DAILY_LIVING_SUM: 56
AS_A_RESULT_OF_YOUR_KNEE_INJURY,_HOW_WOULD_YOU_RATE_YOUR_CURRENT_LEVEL_OF_DAILY_ACTIVITY?: NEARLY NORMAL
LIMPING: THE SYMPTOM AFFECTS MY ACTIVITY SLIGHTLY
GO UP STAIRS: ACTIVITY IS MINIMALLY DIFFICULT
GIVING WAY, BUCKLING OR SHIFTING OF KNEE: I HAVE THE SYMPTOM BUT IT DOES NOT AFFECT MY ACTIVITY
STIFFNESS: I HAVE THE SYMPTOM BUT IT DOES NOT AFFECT MY ACTIVITY

## 2024-04-03 NOTE — PROGRESS NOTES
"PHYSICAL THERAPY EVALUATION  Type of Visit: Evaluation    See electronic medical record for Abuse and Falls Screening details.    Subjective       Presenting condition or subjective complaint: knee pain  Date of onset: 12/06/23    Relevant medical history: Thyroid problems   Dates & types of surgery: cardiac ablation 2023, L knee 2023    Pt had knee scope targeted for fall 2023 but was delayed d/t heart issues.  Knee scope ended up happening 12/06/2023.  Had some bleeding issues after that but those have stabilized.  Rates progress to date as \"90%.\"  Has difficulty walking on concrete, changing directions, uneven surfaces.  Feels better sitting, staying off the legs.  No pattern re: time of day.  Not constant pain.  L knee medial pain.  \"I can feel that it's tight.\"    Prior diagnostic imaging/testing results: MRI     Prior therapy history for the same diagnosis, illness or injury: Yes 2022 as in chart    Prior Level of Function  Transfers: Independent  Ambulation: Independent  ADL: Independent    Living Environment  Social support: With a significant other or spouse   Type of home: Multi-level   Stairs to enter the home: Yes 2 Is there a railing: Yes   Ramp: No   Stairs inside the home: Yes 6 Is there a railing: Yes   Help at home: None  Equipment owned:       Employment:   cabinet makaer  Hobbies/Interests: disc golf, hiking, woodworking    Patient goals for therapy: possibly no pain       Objective   KNEE EVALUATION  INTEGUMENTARY (edema, incisions): Arthroscopic portal scars L knee  GAIT: Pt ambulates without device, minimally antalgic favoring L.  BALANCE/PROPRIOCEPTION: SLS > 10 seconds B.  NON-WEIGHTBEARING ALIGNMENT: Genu varum L > R.  ROM: AROM R knee 0-0-132.  AROM L knee 0-4-122, PROM 0-2-125.  STRENGTH: Diffusely 4+/5 B knee flexion and extension without discomfort.  SPECIAL TESTS: Mild laxity valgus testing near end range extension B without symptoms.  FUNCTIONAL TESTS: No discomfort double limb " "squat.  PALPATION: No tenderness to palpation with exception of \"just a little\" L knee medial joint line.  Joint line circumference 33.5 cm R, 34.4 cm L.    Assessment & Plan   CLINICAL IMPRESSIONS  Medical Diagnosis: S/P arthroscopy of left knee    Treatment Diagnosis: L knee pain   Impression/Assessment: Patient is a 64 year old male with left knee complaints.  The following significant findings have been identified: Pain, Decreased ROM/flexibility, Decreased strength, Decreased activity tolerance, and Impaired posture. These impairments interfere with their ability to perform community mobility as compared to previous level of function.     Clinical Decision Making (Complexity):  Clinical Presentation: Stable/Uncomplicated  Clinical Presentation Rationale: based on medical and personal factors listed in PT evaluation  Clinical Decision Making (Complexity): Low complexity    PLAN OF CARE  Treatment Interventions:  Interventions: Manual Therapy, Neuromuscular Re-education, Therapeutic Activity, Therapeutic Exercise    Long Term Goals     PT Goal 1  Goal Description: Minutes pt will be able to ambulate: 60  Rationale: to maximize safety and independence within the community  Goal Progress: Minutes pt can ambulate: 30-45  Target Date: 05/15/24      Frequency of Treatment: every other week  Duration of Treatment: four visits    Recommended Referrals to Other Professionals: PT referral mentioned  bracing.  Pt notes not being interested in that currently.  His symptoms have improved since that referral was written.  He also would prefer the exercise approach.  Discussed consideration of  bracing should symptoms increase or progress stalls.  Education Assessment:   Learner/Method: Patient  Education Comments: Performance in clinic    Risks and benefits of evaluation/treatment have been explained.   Patient/Family/caregiver agrees with Plan of Care.     Evaluation Time:     PT Eval, Low Complexity Minutes " (22060): 25     Signing Clinician: Serge Roman PT

## 2024-06-01 ENCOUNTER — HEALTH MAINTENANCE LETTER (OUTPATIENT)
Age: 64
End: 2024-06-01

## 2024-06-03 NOTE — PROGRESS NOTES
Pt last seen in PT 04/03.  See MyChart exchange since then.  No further PT is scheduled.  Consider note dated 04/03 to serve as final summary.

## 2024-09-05 ENCOUNTER — MYC MEDICAL ADVICE (OUTPATIENT)
Dept: FAMILY MEDICINE | Facility: CLINIC | Age: 64
End: 2024-09-05
Payer: COMMERCIAL

## 2024-09-05 DIAGNOSIS — E78.5 HYPERLIPIDEMIA LDL GOAL <130: ICD-10-CM

## 2024-09-05 DIAGNOSIS — I10 BENIGN HYPERTENSION: ICD-10-CM

## 2024-09-05 DIAGNOSIS — E06.3 HYPOTHYROIDISM DUE TO HASHIMOTO'S THYROIDITIS: ICD-10-CM

## 2024-09-05 NOTE — TELEPHONE ENCOUNTER
Future Appointments 9/5/2024 - 3/4/2025        Date Visit Type Length Department Provider     9/18/2024  1:40 PM OFFICE VISIT 20 min [facility] Toni Carrillo PA-C    Location Instructions:     [facility] is located at [id number] [place] [address], one block east of Highway  and just north of the [place]. Access the parking lot by turning east from [address] onto [address], then turning north on [address].                   Manjula Peace RN BSN  [facility]

## 2024-09-06 RX ORDER — FELODIPINE 5 MG/1
5 TABLET, EXTENDED RELEASE ORAL DAILY
Qty: 30 TABLET | Refills: 0 | Status: SHIPPED | OUTPATIENT
Start: 2024-09-06 | End: 2024-09-18

## 2024-09-06 RX ORDER — SIMVASTATIN 20 MG
20 TABLET ORAL AT BEDTIME
Qty: 30 TABLET | Refills: 0 | Status: SHIPPED | OUTPATIENT
Start: 2024-09-06 | End: 2024-09-18

## 2024-09-06 RX ORDER — LISINOPRIL AND HYDROCHLOROTHIAZIDE 20; 25 MG/1; MG/1
1 TABLET ORAL DAILY
Qty: 30 TABLET | Refills: 0 | Status: SHIPPED | OUTPATIENT
Start: 2024-09-06 | End: 2024-09-18

## 2024-09-06 RX ORDER — LEVOTHYROXINE SODIUM 100 UG/1
100 TABLET ORAL DAILY
Qty: 30 TABLET | Refills: 0 | Status: SHIPPED | OUTPATIENT
Start: 2024-09-06 | End: 2024-09-18

## 2024-09-18 ENCOUNTER — OFFICE VISIT (OUTPATIENT)
Dept: FAMILY MEDICINE | Facility: CLINIC | Age: 64
End: 2024-09-18
Payer: COMMERCIAL

## 2024-09-18 VITALS
BODY MASS INDEX: 27.46 KG/M2 | OXYGEN SATURATION: 100 % | TEMPERATURE: 97.6 F | SYSTOLIC BLOOD PRESSURE: 130 MMHG | HEIGHT: 62 IN | HEART RATE: 60 BPM | WEIGHT: 149.2 LBS | RESPIRATION RATE: 16 BRPM | DIASTOLIC BLOOD PRESSURE: 80 MMHG

## 2024-09-18 DIAGNOSIS — R97.20 ELEVATED PROSTATE SPECIFIC ANTIGEN (PSA): ICD-10-CM

## 2024-09-18 DIAGNOSIS — Z13.1 SCREENING FOR DIABETES MELLITUS: ICD-10-CM

## 2024-09-18 DIAGNOSIS — I10 BENIGN HYPERTENSION: Primary | ICD-10-CM

## 2024-09-18 DIAGNOSIS — E78.5 HYPERLIPIDEMIA LDL GOAL <130: ICD-10-CM

## 2024-09-18 DIAGNOSIS — E06.3 HYPOTHYROIDISM DUE TO HASHIMOTO'S THYROIDITIS: ICD-10-CM

## 2024-09-18 DIAGNOSIS — R53.83 OTHER FATIGUE: ICD-10-CM

## 2024-09-18 DIAGNOSIS — R06.09 DOE (DYSPNEA ON EXERTION): ICD-10-CM

## 2024-09-18 DIAGNOSIS — E03.8 OTHER SPECIFIED HYPOTHYROIDISM: ICD-10-CM

## 2024-09-18 DIAGNOSIS — I48.92 ATRIAL FLUTTER, UNSPECIFIED TYPE (H): ICD-10-CM

## 2024-09-18 LAB
BASOPHILS # BLD AUTO: 0.1 10E3/UL (ref 0–0.2)
BASOPHILS NFR BLD AUTO: 1 %
EOSINOPHIL # BLD AUTO: 0.3 10E3/UL (ref 0–0.7)
EOSINOPHIL NFR BLD AUTO: 3 %
ERYTHROCYTE [DISTWIDTH] IN BLOOD BY AUTOMATED COUNT: 11.6 % (ref 10–15)
EST. AVERAGE GLUCOSE BLD GHB EST-MCNC: 105 MG/DL
HBA1C MFR BLD: 5.3 % (ref 0–5.6)
HCT VFR BLD AUTO: 39.1 % (ref 40–53)
HGB BLD-MCNC: 13.6 G/DL (ref 13.3–17.7)
IMM GRANULOCYTES # BLD: 0 10E3/UL
IMM GRANULOCYTES NFR BLD: 0 %
LYMPHOCYTES # BLD AUTO: 2.3 10E3/UL (ref 0.8–5.3)
LYMPHOCYTES NFR BLD AUTO: 26 %
MCH RBC QN AUTO: 32.1 PG (ref 26.5–33)
MCHC RBC AUTO-ENTMCNC: 34.8 G/DL (ref 31.5–36.5)
MCV RBC AUTO: 92 FL (ref 78–100)
MONOCYTES # BLD AUTO: 0.8 10E3/UL (ref 0–1.3)
MONOCYTES NFR BLD AUTO: 9 %
NEUTROPHILS # BLD AUTO: 5.2 10E3/UL (ref 1.6–8.3)
NEUTROPHILS NFR BLD AUTO: 61 %
PLATELET # BLD AUTO: 264 10E3/UL (ref 150–450)
RBC # BLD AUTO: 4.24 10E6/UL (ref 4.4–5.9)
WBC # BLD AUTO: 8.6 10E3/UL (ref 4–11)

## 2024-09-18 PROCEDURE — 99214 OFFICE O/P EST MOD 30 MIN: CPT | Performed by: PHYSICIAN ASSISTANT

## 2024-09-18 PROCEDURE — 84443 ASSAY THYROID STIM HORMONE: CPT | Performed by: PHYSICIAN ASSISTANT

## 2024-09-18 PROCEDURE — 85025 COMPLETE CBC W/AUTO DIFF WBC: CPT | Performed by: PHYSICIAN ASSISTANT

## 2024-09-18 PROCEDURE — 80048 BASIC METABOLIC PNL TOTAL CA: CPT | Performed by: PHYSICIAN ASSISTANT

## 2024-09-18 PROCEDURE — 36415 COLL VENOUS BLD VENIPUNCTURE: CPT | Performed by: PHYSICIAN ASSISTANT

## 2024-09-18 PROCEDURE — 84153 ASSAY OF PSA TOTAL: CPT | Performed by: PHYSICIAN ASSISTANT

## 2024-09-18 PROCEDURE — 83036 HEMOGLOBIN GLYCOSYLATED A1C: CPT | Performed by: PHYSICIAN ASSISTANT

## 2024-09-18 PROCEDURE — 80061 LIPID PANEL: CPT | Performed by: PHYSICIAN ASSISTANT

## 2024-09-18 RX ORDER — LISINOPRIL AND HYDROCHLOROTHIAZIDE 20; 25 MG/1; MG/1
1 TABLET ORAL DAILY
Qty: 90 TABLET | Refills: 1 | Status: SHIPPED | OUTPATIENT
Start: 2024-09-18

## 2024-09-18 RX ORDER — SIMVASTATIN 20 MG
20 TABLET ORAL AT BEDTIME
Qty: 90 TABLET | Refills: 1 | Status: SHIPPED | OUTPATIENT
Start: 2024-09-18

## 2024-09-18 RX ORDER — FELODIPINE 5 MG/1
5 TABLET, EXTENDED RELEASE ORAL DAILY
Qty: 90 TABLET | Refills: 1 | Status: SHIPPED | OUTPATIENT
Start: 2024-09-18

## 2024-09-18 RX ORDER — LEVOTHYROXINE SODIUM 100 UG/1
100 TABLET ORAL DAILY
Qty: 90 TABLET | Refills: 3 | Status: SHIPPED | OUTPATIENT
Start: 2024-09-18

## 2024-09-18 NOTE — PROGRESS NOTES
"  Delmy   Johan is a 64 year old, presenting for the following health issues:  afib, Thyroid Disease, Hypertension, and Hyperlipidemia        9/18/2024     1:37 PM   Additional Questions   Roomed by Ashley   Accompanied by Self     Via the Health Maintenance questionnaire, the patient has reported the following services have been completed -Colonscopy: aiden 2018-05-11, this information has been sent to the abstraction team.  History of Present Illness       Hyperlipidemia:  He presents for follow up of hyperlipidemia.   He is taking medication to lower cholesterol. He is not having myalgia or other side effects to statin medications.    Hypertension: He presents for follow up of hypertension.  He does check blood pressure  regularly outside of the clinic. Outside blood pressures have been over 140/90. He follows a low salt diet.     Hypothyroidism:     Since last visit, patient describes the following symptoms::  None    He eats 0-1 servings of fruits and vegetables daily.He consumes 0 sweetened beverage(s) daily.He exercises with enough effort to increase his heart rate 10 to 19 minutes per day.  He exercises with enough effort to increase his heart rate 3 or less days per week.   He is taking medications regularly.   Home blood pressure numbers have looked good.  No chest pain/palpitations/dizziness/ha's  Some exertional fatigue. Some occasional CORTEZ.  No sweats or fevers or itching.  No edema  No cough or wheezing.   No diarrhea /constipation. No blood in stools.   Mild weight gain.      Review of Systems  Constitutional, HEENT, cardiovascular, pulmonary, GI, , musculoskeletal, neuro, skin, endocrine and psych systems are negative, except as otherwise noted.      Objective    /80   Pulse 60   Temp 97.6  F (36.4  C) (Temporal)   Resp 16   Ht 1.584 m (5' 2.36\")   Wt 67.7 kg (149 lb 3.2 oz)   SpO2 100%   BMI 26.97 kg/m    Body mass index is 26.97 kg/m .  Physical Exam   Eye exam - right eye normal " lid, conjunctiva, cornea, pupil and fundus, left eye normal lid, conjunctiva, cornea, pupil and fundus.  Thyroid not palpable, not enlarged, no nodules detected.  CHEST:chest clear to IPPA, no tachypnea, retractions or cyanosis, and S1, S2 normal, no murmur, no gallop, rate regular.  No edema      Johan was seen today for afib, thyroid disease, hypertension and hyperlipidemia.    Diagnoses and all orders for this visit:    Benign hypertension  -     BASIC METABOLIC PANEL; Future  -     felodipine ER (PLENDIL) 5 MG 24 hr tablet; Take 1 tablet (5 mg) by mouth daily.  -     lisinopril-hydrochlorothiazide (ZESTORETIC) 20-25 MG tablet; Take 1 tablet by mouth daily.  -     BASIC METABOLIC PANEL    Hyperlipidemia LDL goal <130  -     Lipid panel reflex to direct LDL Non-fasting; Future  -     simvastatin (ZOCOR) 20 MG tablet; Take 1 tablet (20 mg) by mouth at bedtime.  -     Lipid panel reflex to direct LDL Non-fasting    Atrial flutter, unspecified type (H)    Elevated prostate specific antigen (PSA)  -     PSA, tumor marker; Future  -     PSA, tumor marker    Other fatigue  -     CBC with platelets and differential; Future  -     CBC with platelets and differential    Other specified hypothyroidism  -     Cancel: TSH WITH FREE T4 REFLEX; Future  -     TSH; Future  -     TSH    Screening for diabetes mellitus  -     Hemoglobin A1c; Future  -     Hemoglobin A1c    Hypothyroidism due to Hashimoto's thyroiditis  -     levothyroxine (SYNTHROID/LEVOTHROID) 100 MCG tablet; Take 1 tablet (100 mcg) by mouth daily.    CORTEZ (dyspnea on exertion)  -     Echocardiogram Complete; Future    Other orders  -     REVIEW OF HEALTH MAINTENANCE PROTOCOL ORDERS      Lower sodium diet  Continue to work on a lower sugar/starch diet and more exercise.          Signed Electronically by: Toni Carrillo PA-C

## 2024-09-19 LAB
ANION GAP SERPL CALCULATED.3IONS-SCNC: 12 MMOL/L (ref 7–15)
BUN SERPL-MCNC: 16.2 MG/DL (ref 8–23)
CALCIUM SERPL-MCNC: 8.8 MG/DL (ref 8.8–10.4)
CHLORIDE SERPL-SCNC: 100 MMOL/L (ref 98–107)
CHOLEST SERPL-MCNC: 140 MG/DL
CREAT SERPL-MCNC: 0.9 MG/DL (ref 0.67–1.17)
EGFRCR SERPLBLD CKD-EPI 2021: >90 ML/MIN/1.73M2
FASTING STATUS PATIENT QL REPORTED: NO
FASTING STATUS PATIENT QL REPORTED: NO
GLUCOSE SERPL-MCNC: 99 MG/DL (ref 70–99)
HCO3 SERPL-SCNC: 25 MMOL/L (ref 22–29)
HDLC SERPL-MCNC: 46 MG/DL
LDLC SERPL CALC-MCNC: 61 MG/DL
NONHDLC SERPL-MCNC: 94 MG/DL
POTASSIUM SERPL-SCNC: 3.9 MMOL/L (ref 3.4–5.3)
PSA SERPL DL<=0.01 NG/ML-MCNC: 13.2 NG/ML (ref 0–4.5)
SODIUM SERPL-SCNC: 137 MMOL/L (ref 135–145)
TRIGL SERPL-MCNC: 167 MG/DL
TSH SERPL DL<=0.005 MIU/L-ACNC: 2.7 UIU/ML (ref 0.3–4.2)

## 2024-10-02 ENCOUNTER — ANCILLARY PROCEDURE (OUTPATIENT)
Dept: CARDIOLOGY | Facility: CLINIC | Age: 64
End: 2024-10-02
Attending: PHYSICIAN ASSISTANT
Payer: COMMERCIAL

## 2024-10-02 DIAGNOSIS — R06.09 DOE (DYSPNEA ON EXERTION): ICD-10-CM

## 2024-10-02 LAB — LVEF ECHO: NORMAL

## 2024-10-02 PROCEDURE — 93306 TTE W/DOPPLER COMPLETE: CPT | Performed by: INTERNAL MEDICINE

## 2024-11-18 ENCOUNTER — TELEPHONE (OUTPATIENT)
Dept: UROLOGY | Facility: CLINIC | Age: 64
End: 2024-11-18
Payer: COMMERCIAL

## 2024-11-18 ENCOUNTER — TELEPHONE (OUTPATIENT)
Dept: FAMILY MEDICINE | Facility: CLINIC | Age: 64
End: 2024-11-18
Payer: COMMERCIAL

## 2024-11-18 NOTE — TELEPHONE ENCOUNTER
[facility] Call Center    Phone Message    May a detailed message be left on voicemail: yes     Reason for Call: Other: Pt is switching Uro clinics and [facility] fax: [phone]. Pt would like a MyChart when this is done. Thanks      Action Taken: Other: uro    Travel Screening: Not Applicable     Date of Service:

## 2024-11-18 NOTE — TELEPHONE ENCOUNTER
General Call      Reason for Call:  patient called and is switching Urology clinics.    Patient states needs all PSA testing sent to the new Urology clinic.    Over the last 4 years.    [place], [place]  Fax Number:  [phone]  Telephone Number:  [phone]    Please contact patient to let know.  Thank you.    What are your questions or concerns:  no    Date of last appointment with provider: Sept 2024    Could we send this information to you in [employer] or would you prefer to receive a phone call?:   Patient would prefer a phone call   Okay to leave a detailed message?: Yes at Cell number on file:    Telephone Information:   Mobile [phone]

## 2024-11-21 NOTE — TELEPHONE ENCOUNTER
Spoke with patient and attempted to give him [facility] Records number [phone]. Patient stated that he filled out an OMAR form via [employer] but he would like a [employer] message sent with the phone number for records be sent to him so he has it on file.     Sarai Matthew on 11/21/2024 at 2:55 PM

## 2025-01-16 ENCOUNTER — OFFICE VISIT (OUTPATIENT)
Dept: FAMILY MEDICINE | Facility: CLINIC | Age: 65
End: 2025-01-16
Payer: COMMERCIAL

## 2025-01-16 VITALS
HEART RATE: 61 BPM | TEMPERATURE: 98.3 F | RESPIRATION RATE: 24 BRPM | OXYGEN SATURATION: 98 % | BODY MASS INDEX: 28.37 KG/M2 | SYSTOLIC BLOOD PRESSURE: 122 MMHG | HEIGHT: 62 IN | WEIGHT: 154.2 LBS | DIASTOLIC BLOOD PRESSURE: 84 MMHG

## 2025-01-16 DIAGNOSIS — I10 BENIGN HYPERTENSION: ICD-10-CM

## 2025-01-16 DIAGNOSIS — Z01.818 PREOP GENERAL PHYSICAL EXAM: Primary | ICD-10-CM

## 2025-01-16 DIAGNOSIS — I48.92 ATRIAL FLUTTER, UNSPECIFIED TYPE (H): ICD-10-CM

## 2025-01-16 LAB
BASOPHILS # BLD AUTO: 0 10E3/UL (ref 0–0.2)
BASOPHILS NFR BLD AUTO: 0 %
EOSINOPHIL # BLD AUTO: 0.2 10E3/UL (ref 0–0.7)
EOSINOPHIL NFR BLD AUTO: 2 %
ERYTHROCYTE [DISTWIDTH] IN BLOOD BY AUTOMATED COUNT: 11.6 % (ref 10–15)
HCT VFR BLD AUTO: 39.3 % (ref 40–53)
HGB BLD-MCNC: 13.6 G/DL (ref 13.3–17.7)
IMM GRANULOCYTES # BLD: 0 10E3/UL
IMM GRANULOCYTES NFR BLD: 0 %
LYMPHOCYTES # BLD AUTO: 2.1 10E3/UL (ref 0.8–5.3)
LYMPHOCYTES NFR BLD AUTO: 25 %
MCH RBC QN AUTO: 31.3 PG (ref 26.5–33)
MCHC RBC AUTO-ENTMCNC: 34.6 G/DL (ref 31.5–36.5)
MCV RBC AUTO: 90 FL (ref 78–100)
MONOCYTES # BLD AUTO: 1 10E3/UL (ref 0–1.3)
MONOCYTES NFR BLD AUTO: 11 %
NEUTROPHILS # BLD AUTO: 5.4 10E3/UL (ref 1.6–8.3)
NEUTROPHILS NFR BLD AUTO: 62 %
PLATELET # BLD AUTO: 337 10E3/UL (ref 150–450)
RBC # BLD AUTO: 4.35 10E6/UL (ref 4.4–5.9)
WBC # BLD AUTO: 8.7 10E3/UL (ref 4–11)

## 2025-01-16 RX ORDER — TAMSULOSIN HYDROCHLORIDE 0.4 MG/1
1 CAPSULE ORAL AT BEDTIME
COMMUNITY
Start: 2025-01-07

## 2025-01-16 NOTE — PROGRESS NOTES
Preoperative Evaluation  [facility] [place]  [address]  [place] [address]  Phone: [phone]  Primary Provider: Toni Carrillo PA-C  Pre-op Performing Provider: Toni Carrillo PA-C  Jan 16, 2025 1/14/2025   Surgical Information   What procedure is being done? Ep study and ablation   Facility or Hospital where procedure/surgery will be performed: [facility]   Who is doing the procedure / surgery? Dr micki Rodriguez   Date of surgery / procedure: January 28th 2025   Time of surgery / procedure: 7:30 am   Where do you plan to recover after surgery? at home with family     Fax number for surgical facility: Patient does not have fax number      Subjective   Johan is a 64 year old, presenting for the following:  Pre-Op Exam (1/28/25/Heart Surgery/[facility]/Dr. Rodriguez) and Health Maintenance (Patient declines vaccines today)          1/16/2025    11:13 AM   Additional Questions   Roomed by Saundra Melgoza CMA   Accompanied by None         1/16/2025    11:13 AM   Patient Reported Additional Medications   Patient reports taking the following new medications none     HPI related to upcoming procedure: history of atrial flutter        1/14/2025   Pre-Op Questionnaire   Have you ever had a heart attack or stroke? No   Have you ever had surgery on your heart or blood vessels, such as a stent placement, a coronary artery bypass, or surgery on an artery in your head, neck, heart, or legs? (!) YES, previous cardiac ablation   Do you have chest pain with activity? No   Do you have a history of heart failure? No   Do you currently have a cold, bronchitis or symptoms of other infection? No   Do you have a cough, shortness of breath, or wheezing? No   Do you or anyone in your family have previous history of blood clots? No   Do you or does anyone in your family have a serious bleeding problem such as prolonged bleeding following surgeries or cuts? No    Have you ever had problems with anemia or been told to take iron pills? No   Have you had any abnormal blood loss such as black, tarry or bloody stools? No   Have you ever had a blood transfusion? No   Are you willing to have a blood transfusion if it is medically needed before, during, or after your surgery? Yes   Have you or any of your relatives ever had problems with anesthesia? No   Do you have sleep apnea, excessive snoring or daytime drowsiness? No   Do you have any artifical heart valves or other implanted medical devices like a pacemaker, defibrillator, or continuous glucose monitor? No   Do you have artificial joints? No   Are you allergic to latex? No     Health Care Directive  Patient does not have a Health Care Directive: Discussed advance care planning with patient; information given to patient to review.    Preoperative Review of    reviewed - no record of controlled substances prescribed.      Status of Chronic Conditions:  See problem list for active medical problems.  Problems all longstanding and stable, except as noted/documented.  See ROS for pertinent symptoms related to these conditions.    Patient Active Problem List    Diagnosis Date Noted    Osteoarthritis of left knee, unspecified osteoarthritis type 04/03/2024     Priority: Medium    Old tear of medial meniscus of left knee, unspecified tear type 10/18/2023     Priority: Medium    Tear of lateral meniscus of left knee, current, unspecified tear type, subsequent encounter 10/18/2023     Priority: Medium    Chondromalacia of left knee 10/18/2023     Priority: Medium    Atrial flutter, unspecified type (H) 10/17/2023     Priority: Medium    Derangement of medial meniscus of left knee 09/14/2023     Priority: Medium    Other tear of lateral meniscus of left knee as current injury, subsequent encounter 09/14/2023     Priority: Medium    Left knee pain 01/11/2023     Priority: Medium    Hypothyroidism 02/24/2012     Priority: Medium      Labs placed and requested update.       Hyperlipidemia LDL goal <130 02/24/2012     Priority: Medium     LDL      101   5/4/2012         Benign hypertension 02/24/2012     Priority: Medium     BP Readings from Last 1 Encounters:   02/24/12 174/84   Labs placed and requested update.         Erectile dysfunction 02/24/2012     Priority: Medium     Some ED.  May be medications too. Check testosterone.  Discussed Levitra.         Past Medical History:   Diagnosis Date    Arthritis 2019    Atrial flutter, unspecified type (H) 10/17/2023    Hypertension 2010    Longtime ago    Thyroid disease 1996     Past Surgical History:   Procedure Laterality Date    ARTHROSCOPY KNEE WITH MEDIAL MENISCECTOMY Left 12/6/2023    Procedure: ARTHROSCOPY,LEFT KNEE WITH PARTIAL MEDIAL AND LATERAL MENISECTOMY;  Surgeon: Koby Mcmahon MD;  Location: [facility] OR    COLONOSCOPY  2018    HERNIA REPAIR  2018     Current Outpatient Medications   Medication Sig Dispense Refill    apixaban ANTICOAGULANT (ELIQUIS) 5 MG tablet Take 5 mg by mouth      felodipine ER (PLENDIL) 5 MG 24 hr tablet Take 1 tablet (5 mg) by mouth daily. 90 tablet 1    levothyroxine (SYNTHROID/LEVOTHROID) 100 MCG tablet Take 1 tablet (100 mcg) by mouth daily. 90 tablet 3    lisinopril-hydrochlorothiazide (ZESTORETIC) 20-25 MG tablet Take 1 tablet by mouth daily. 90 tablet 1    simvastatin (ZOCOR) 20 MG tablet Take 1 tablet (20 mg) by mouth at bedtime. 90 tablet 1    sotalol (BETAPACE) 80 MG tablet Take 1 tablet (80 mg) by mouth 2 times daily      tamsulosin (FLOMAX) 0.4 MG capsule Take 1 capsule by mouth at bedtime.         Allergies   Allergen Reactions    Amoxicillin Unknown    Amoxicillin-Pot Clavulanate Nausea and Vomiting    Clavulanic Acid Unknown        Social History     Tobacco Use    Smoking status: Never     Passive exposure: Past (Dad smoked in the home while he was growing up.)    Smokeless tobacco: Never   Substance Use Topics    Alcohol use: Yes     Comment:  "weekends     Family History   Problem Relation Age of Onset    Cerebrovascular Disease Mother     Hypertension Mother      History   Drug Use No             Review of Systems  Constitutional, HEENT, cardiovascular, pulmonary, GI, , musculoskeletal, neuro, skin, endocrine and psych systems are negative, except as otherwise noted.    Objective    /84   Pulse 61   Temp 98.3  F (36.8  C) (Oral)   Resp 24   Ht 1.575 m (5' 2\")   Wt 69.9 kg (154 lb 3.2 oz)   SpO2 98%   BMI 28.20 kg/m     Estimated body mass index is 28.2 kg/m  as calculated from the following:    Height as of this encounter: 1.575 m (5' 2\").    Weight as of this encounter: 69.9 kg (154 lb 3.2 oz).  Physical Exam  GENERAL: alert and no distress  EYES: Eyes grossly normal to inspection, PERRL and conjunctivae and sclerae normal  HENT: ear canals and TM's normal, nose and mouth without ulcers or lesions  NECK: no adenopathy, no asymmetry, masses, or scars  RESP: lungs clear to auscultation - no rales, rhonchi or wheezes  CV: irregularly irregular rhythm, rate with in normal range (suspect a brief ruin of a fib/flutter), no murmur, click or rub, peripheral pulses strong, and no peripheral edema  ABDOMEN: soft, nontender, no hepatosplenomegaly, no masses and bowel sounds normal  MS: no gross musculoskeletal defects noted, no edema  SKIN: no suspicious lesions or rashes  NEURO: Normal strength and tone, mentation intact and speech normal  PSYCH: mentation appears normal, affect normal/bright    Recent Labs   Lab Test 09/18/24  1412   HGB 13.6         POTASSIUM 3.9   CR 0.90   A1C 5.3        Diagnostics  Labs pending at this time.  Results will be reviewed when available.   EKG: appears normal, NSR, normal axis, normal intervals, no acute ST/T changes c/w ischemia, no LVH by voltage criteria, unchanged from previous tracings, pvc noted     Revised Cardiac Risk Index (RCRI)  The patient has the following serious cardiovascular risks for " perioperative complications:   - No serious cardiac risks = 0 points     RCRI Interpretation: 0 points: Class I (very low risk - 0.4% complication rate)    Johan was seen today for pre-op exam and health maintenance.    Diagnoses and all orders for this visit:    Preop general physical exam    Benign hypertension  -     BASIC METABOLIC PANEL; Future  -     CBC with platelets and differential; Future  -     EKG 12-lead complete w/read - Clinics    Atrial flutter, unspecified type (H)  -     CBC with platelets and differential; Future  -     EKG 12-lead complete w/read - Clinics           Johan is cleared for his procedure and appropriate anesthesia .    Hold eliquis 48 hours prior to your procedure.    Take your sotalol on the morning of surgery, otherwise, hold all other meds that morning and be fasting.       Signed Electronically by: Toni Carrillo PA-C  A copy of this evaluation report is provided to the requesting physician.

## 2025-01-28 DIAGNOSIS — I10 BENIGN HYPERTENSION: ICD-10-CM

## 2025-01-28 RX ORDER — FELODIPINE 5 MG/1
5 TABLET, EXTENDED RELEASE ORAL DAILY
Qty: 17 TABLET | Refills: 0 | Status: SHIPPED | OUTPATIENT
Start: 2025-01-28

## 2025-02-13 ENCOUNTER — TELEPHONE (OUTPATIENT)
Dept: FAMILY MEDICINE | Facility: CLINIC | Age: 65
End: 2025-02-13
Payer: COMMERCIAL

## 2025-07-13 NOTE — TELEPHONE ENCOUNTER
[facility] didn't have room for this surgery on 12/01/2023 patient has been rescheduled to 12/08/2023  Preop 11/20/2023  Postop 12/21/2023   
Spoke with patient dr. Mcmahon is not available for surgery on 12/08/2023 he has rescheduled to 12/06/2023 in [facility]  Preop and postop remain the same    
Spoke with patient he is contacting his cardiologist first before scheduling surgery. He will call us back when ready to schedule [phone]   
Type of surgery: ARTHROSCOPY,LEFT KNEEWITH MENISECTOMY , POSSIBLE CHONDROPLASTY   Location of surgery: [facility]  Date and time of surgery: 12/01/2023  Surgeon: VOLODYMYR  Pre-Op Appt Date: 11/20/2023  Post-Op Appt Date: 12/14/2023   Packet sent out: Yes  Pre-cert/Authorization completed:  No  Date:    
Yes

## (undated) DEVICE — SOL PRP PVP IOD .75OZ PCH PKT CNTNR STRL DYNDA2232A

## (undated) DEVICE — GLOVE BIOGEL PI MICRO INDICATOR UNDERGLOVE SZ 7.5 48975

## (undated) DEVICE — BNDG ELASTIC 6"X5YDS UNSTERILE 6611-60

## (undated) DEVICE — GLOVE BIOGEL PI MICRO INDICATOR UNDERGLOVE SZ 8.0 48980

## (undated) DEVICE — DRSG STERI STRIP 1/2X4" R1547

## (undated) DEVICE — TUBING ARTHROSCOPY PUMP ARTHREX AR-6410

## (undated) DEVICE — PACK ARTHROSCOPY KNEE SOP15AKFSM

## (undated) DEVICE — DRSG ABDOMINAL 07 1/2X8" 7197D

## (undated) DEVICE — MANIFOLD NEPTUNE 4 PORT 700-20

## (undated) DEVICE — BUR ARTHREX COOLCUT DISSECTOR 4.0MMX13CM AR-8400DS

## (undated) DEVICE — GLOVE BIOGEL PI ULTRATOUCH G SZ 8.5 42185

## (undated) DEVICE — SOL WATER IRRIG 1000ML BOTTLE 07139-09

## (undated) DEVICE — GLOVE BIOGEL PI ULTRATOUCH G SZ 7.5 42175

## (undated) DEVICE — SOL NACL 0.9% IRRIG 3000ML BAG 07972-08

## (undated) DEVICE — PREP CHLORAPREP 26ML TINTED ORANGE  260815

## (undated) RX ORDER — DEXAMETHASONE SODIUM PHOSPHATE 4 MG/ML
INJECTION, SOLUTION INTRA-ARTICULAR; INTRALESIONAL; INTRAMUSCULAR; INTRAVENOUS; SOFT TISSUE
Status: DISPENSED
Start: 2023-12-06

## (undated) RX ORDER — ONDANSETRON 2 MG/ML
INJECTION INTRAMUSCULAR; INTRAVENOUS
Status: DISPENSED
Start: 2023-12-06

## (undated) RX ORDER — EPHEDRINE SULFATE 50 MG/ML
INJECTION, SOLUTION INTRAMUSCULAR; INTRAVENOUS; SUBCUTANEOUS
Status: DISPENSED
Start: 2023-12-06

## (undated) RX ORDER — FENTANYL CITRATE 50 UG/ML
INJECTION, SOLUTION INTRAMUSCULAR; INTRAVENOUS
Status: DISPENSED
Start: 2023-12-06

## (undated) RX ORDER — CEFAZOLIN SODIUM 2 G/50ML
SOLUTION INTRAVENOUS
Status: DISPENSED
Start: 2023-12-06

## (undated) RX ORDER — PROPOFOL 10 MG/ML
INJECTION, EMULSION INTRAVENOUS
Status: DISPENSED
Start: 2023-12-06

## (undated) RX ORDER — ACETAMINOPHEN 325 MG/1
TABLET ORAL
Status: DISPENSED
Start: 2023-12-06